# Patient Record
Sex: FEMALE | Race: WHITE | Employment: OTHER | URBAN - METROPOLITAN AREA
[De-identification: names, ages, dates, MRNs, and addresses within clinical notes are randomized per-mention and may not be internally consistent; named-entity substitution may affect disease eponyms.]

---

## 2020-01-07 LAB
CREATININE, EXTERNAL: 0.92
HBA1C MFR BLD HPLC: 8.4 %

## 2021-04-14 RX ORDER — LEVOTHYROXINE SODIUM 125 UG/1
125 TABLET ORAL
Qty: 90 TAB | Refills: 0 | Status: SHIPPED | OUTPATIENT
Start: 2021-04-14 | End: 2021-07-26 | Stop reason: SDUPTHER

## 2021-04-14 NOTE — TELEPHONE ENCOUNTER
Next Appointment: 5/7/21 with MD Keven Deluna    Requested Prescriptions     Pending Prescriptions Disp Refills    levothyroxine (Synthroid) 125 mcg tablet 90 Tab 0     Sig: Take 1 Tab by mouth Daily (before breakfast).

## 2021-05-07 ENCOUNTER — OFFICE VISIT (OUTPATIENT)
Dept: INTERNAL MEDICINE CLINIC | Age: 81
End: 2021-05-07
Payer: MEDICARE

## 2021-05-07 VITALS
SYSTOLIC BLOOD PRESSURE: 139 MMHG | WEIGHT: 158.6 LBS | HEART RATE: 76 BPM | TEMPERATURE: 97.5 F | BODY MASS INDEX: 25.49 KG/M2 | HEIGHT: 66 IN | OXYGEN SATURATION: 98 % | DIASTOLIC BLOOD PRESSURE: 78 MMHG | RESPIRATION RATE: 12 BRPM

## 2021-05-07 DIAGNOSIS — R63.4 WEIGHT LOSS: ICD-10-CM

## 2021-05-07 DIAGNOSIS — E78.5 HYPERLIPIDEMIA ASSOCIATED WITH TYPE 2 DIABETES MELLITUS (HCC): ICD-10-CM

## 2021-05-07 DIAGNOSIS — I25.811 CORONARY ARTERY DISEASE INVOLVING NATIVE ARTERY OF TRANSPLANTED HEART WITHOUT ANGINA PECTORIS: Primary | ICD-10-CM

## 2021-05-07 DIAGNOSIS — J45.20 MILD INTERMITTENT ASTHMA WITHOUT COMPLICATION: ICD-10-CM

## 2021-05-07 DIAGNOSIS — E11.9 TYPE 2 DIABETES MELLITUS TREATED WITHOUT INSULIN (HCC): ICD-10-CM

## 2021-05-07 DIAGNOSIS — I10 ESSENTIAL HYPERTENSION: ICD-10-CM

## 2021-05-07 DIAGNOSIS — E11.69 HYPERLIPIDEMIA ASSOCIATED WITH TYPE 2 DIABETES MELLITUS (HCC): ICD-10-CM

## 2021-05-07 DIAGNOSIS — R63.1 POLYDIPSIA: ICD-10-CM

## 2021-05-07 DIAGNOSIS — E03.9 ACQUIRED HYPOTHYROIDISM: ICD-10-CM

## 2021-05-07 PROCEDURE — 99215 OFFICE O/P EST HI 40 MIN: CPT | Performed by: INTERNAL MEDICINE

## 2021-05-07 PROCEDURE — G0463 HOSPITAL OUTPT CLINIC VISIT: HCPCS | Performed by: INTERNAL MEDICINE

## 2021-05-07 RX ORDER — CLOPIDOGREL BISULFATE 75 MG/1
75 TABLET ORAL
COMMUNITY
End: 2021-07-26 | Stop reason: SDUPTHER

## 2021-05-10 ENCOUNTER — HOSPITAL ENCOUNTER (OUTPATIENT)
Dept: LAB | Age: 81
Discharge: HOME | End: 2021-05-10
Payer: MEDICARE

## 2021-05-10 ENCOUNTER — APPOINTMENT (OUTPATIENT)
Dept: INTERNAL MEDICINE CLINIC | Age: 81
End: 2021-05-10

## 2021-05-10 DIAGNOSIS — R63.4 WEIGHT LOSS: ICD-10-CM

## 2021-05-10 DIAGNOSIS — E11.9 TYPE 2 DIABETES MELLITUS TREATED WITHOUT INSULIN (HCC): ICD-10-CM

## 2021-05-10 DIAGNOSIS — R63.1 POLYDIPSIA: ICD-10-CM

## 2021-05-10 DIAGNOSIS — I10 ESSENTIAL HYPERTENSION: ICD-10-CM

## 2021-05-10 DIAGNOSIS — E03.9 ACQUIRED HYPOTHYROIDISM: ICD-10-CM

## 2021-05-10 DIAGNOSIS — E11.69 HYPERLIPIDEMIA ASSOCIATED WITH TYPE 2 DIABETES MELLITUS (HCC): ICD-10-CM

## 2021-05-10 DIAGNOSIS — I25.811 CORONARY ARTERY DISEASE INVOLVING NATIVE ARTERY OF TRANSPLANTED HEART WITHOUT ANGINA PECTORIS: ICD-10-CM

## 2021-05-10 DIAGNOSIS — E78.5 HYPERLIPIDEMIA ASSOCIATED WITH TYPE 2 DIABETES MELLITUS (HCC): ICD-10-CM

## 2021-05-10 LAB
ALBUMIN SERPL-MCNC: 3.5 G/DL (ref 3.4–5)
ALBUMIN/GLOB SERPL: 1.1 {RATIO} (ref 0.8–1.7)
ALP SERPL-CCNC: 117 U/L (ref 45–117)
ALT SERPL-CCNC: 18 U/L (ref 13–56)
ANION GAP SERPL CALC-SCNC: 4 MMOL/L (ref 3–18)
APPEARANCE UR: ABNORMAL
AST SERPL-CCNC: 10 U/L (ref 10–38)
BACTERIA URNS QL MICRO: ABNORMAL /HPF
BILIRUB SERPL-MCNC: 0.5 MG/DL (ref 0.2–1)
BILIRUB UR QL: NEGATIVE
BUN SERPL-MCNC: 16 MG/DL (ref 7–18)
BUN/CREAT SERPL: 15 (ref 12–20)
CALCIUM SERPL-MCNC: 8.3 MG/DL (ref 8.5–10.1)
CHLORIDE SERPL-SCNC: 103 MMOL/L (ref 100–111)
CHOLEST SERPL-MCNC: 134 MG/DL
CO2 SERPL-SCNC: 29 MMOL/L (ref 21–32)
COLOR UR: YELLOW
CREAT SERPL-MCNC: 1.05 MG/DL (ref 0.6–1.3)
CREAT UR-MCNC: 50 MG/DL (ref 30–125)
EPITH CASTS URNS QL MICRO: ABNORMAL /LPF (ref 0–5)
EST. AVERAGE GLUCOSE BLD GHB EST-MCNC: 298 MG/DL
GLOBULIN SER CALC-MCNC: 3.1 G/DL (ref 2–4)
GLUCOSE SERPL-MCNC: 339 MG/DL (ref 74–99)
GLUCOSE UR STRIP.AUTO-MCNC: >1000 MG/DL
HBA1C MFR BLD: 12 % (ref 4.2–5.6)
HDLC SERPL-MCNC: 39 MG/DL (ref 40–60)
HDLC SERPL: 3.4 {RATIO} (ref 0–5)
HGB UR QL STRIP: NEGATIVE
KETONES UR QL STRIP.AUTO: NEGATIVE MG/DL
LDLC SERPL CALC-MCNC: 70.4 MG/DL (ref 0–100)
LEUKOCYTE ESTERASE UR QL STRIP.AUTO: ABNORMAL
LIPID PROFILE,FLP: ABNORMAL
MICROALBUMIN UR-MCNC: 2.06 MG/DL (ref 0–3)
MICROALBUMIN/CREAT UR-RTO: 41 MG/G (ref 0–30)
NITRITE UR QL STRIP.AUTO: NEGATIVE
PH UR STRIP: 5 [PH] (ref 5–8)
POTASSIUM SERPL-SCNC: 4.6 MMOL/L (ref 3.5–5.5)
PROT SERPL-MCNC: 6.6 G/DL (ref 6.4–8.2)
PROT UR STRIP-MCNC: NEGATIVE MG/DL
RBC #/AREA URNS HPF: NEGATIVE /HPF (ref 0–5)
SODIUM SERPL-SCNC: 136 MMOL/L (ref 136–145)
SP GR UR REFRACTOMETRY: >1.03 (ref 1–1.03)
TRIGL SERPL-MCNC: 123 MG/DL (ref ?–150)
TSH SERPL DL<=0.05 MIU/L-ACNC: 0.29 UIU/ML (ref 0.36–3.74)
UROBILINOGEN UR QL STRIP.AUTO: 0.2 EU/DL (ref 0.2–1)
VLDLC SERPL CALC-MCNC: 24.6 MG/DL
WBC URNS QL MICRO: ABNORMAL /HPF (ref 0–4)

## 2021-05-10 PROCEDURE — 80061 LIPID PANEL: CPT

## 2021-05-10 PROCEDURE — 36415 COLL VENOUS BLD VENIPUNCTURE: CPT

## 2021-05-10 PROCEDURE — 84443 ASSAY THYROID STIM HORMONE: CPT

## 2021-05-10 PROCEDURE — 81001 URINALYSIS AUTO W/SCOPE: CPT

## 2021-05-10 PROCEDURE — 82570 ASSAY OF URINE CREATININE: CPT

## 2021-05-10 PROCEDURE — 80053 COMPREHEN METABOLIC PANEL: CPT

## 2021-05-10 PROCEDURE — 83036 HEMOGLOBIN GLYCOSYLATED A1C: CPT

## 2021-05-20 ENCOUNTER — TELEPHONE (OUTPATIENT)
Dept: INTERNAL MEDICINE CLINIC | Age: 81
End: 2021-05-20

## 2021-05-20 DIAGNOSIS — E11.9 TYPE 2 DIABETES MELLITUS TREATED WITHOUT INSULIN (HCC): Primary | ICD-10-CM

## 2021-05-20 DIAGNOSIS — E03.9 ACQUIRED HYPOTHYROIDISM: Primary | ICD-10-CM

## 2021-05-20 DIAGNOSIS — M81.0 AGE-RELATED OSTEOPOROSIS WITHOUT CURRENT PATHOLOGICAL FRACTURE: ICD-10-CM

## 2021-05-20 RX ORDER — METFORMIN HYDROCHLORIDE 500 MG/1
TABLET, EXTENDED RELEASE ORAL
Qty: 270 TABLET | Refills: 1 | Status: SHIPPED | OUTPATIENT
Start: 2021-05-20 | End: 2021-11-11 | Stop reason: SDUPTHER

## 2021-05-20 RX ORDER — ERGOCALCIFEROL 1.25 MG/1
50000 CAPSULE ORAL
Qty: 12 CAPSULE | Refills: 1 | Status: SHIPPED | OUTPATIENT
Start: 2021-05-20 | End: 2021-11-11 | Stop reason: SDUPTHER

## 2021-05-20 NOTE — TELEPHONE ENCOUNTER
Let her know her hgb A1C was very high at 12% I sent for 3 metformin dialy instead of just 2.   That's why she has been so thristy

## 2021-05-20 NOTE — TELEPHONE ENCOUNTER
Last Visit: 5/7/21 with MD Nohelia Lucero  Next Appointment: 8/12/21 with MD russell    Requested Prescriptions     Pending Prescriptions Disp Refills    ergocalciferol (ERGOCALCIFEROL) 1,250 mcg (50,000 unit) capsule 12 Capsule 1     Sig: Take 1 Capsule by mouth every seven (7) days.  metFORMIN ER (GLUCOPHAGE XR) 500 mg tablet 180 Tablet 1     Sig: Take 2 Tablets by mouth daily.        From outside meds tabs;

## 2021-05-20 NOTE — TELEPHONE ENCOUNTER
See other message.   Also let her know her labs showed she is on too much thyroid medication-- change it to 1/2 tab on Saturdays and Sundays, 1 tab all the other days, lab order for TSH to do in 6 weeks is in there, non-fasitng OK

## 2021-05-20 NOTE — TELEPHONE ENCOUNTER
Patient wanted clarification on Metformin. Patient wants to know if she can take one tab with meals or does she take them all 3 at one time. Informed patient will have to check with Dr. Carola Guerrero and call her back.          Take all 3 at the same time

## 2021-05-20 NOTE — PROGRESS NOTES
After discussion, sterile prep and local ethyl chloride and 1% lidocaine, joint injected via medial approach with Kenalog 40 1 cc + lidocaine 1% 4 cc without complication

## 2021-05-21 NOTE — TELEPHONE ENCOUNTER
Patient is aware :  Also let her know her labs showed she is on too much thyroid medication-- change it to 1/2 tab on Saturdays and Sundays, 1 tab all the other days, lab order for TSH to do in 6 weeks. Patient verbalizes understanding and lab appointment scheduled for TSH only.

## 2021-06-25 ENCOUNTER — TELEPHONE (OUTPATIENT)
Dept: INTERNAL MEDICINE CLINIC | Age: 81
End: 2021-06-25

## 2021-06-25 ENCOUNTER — HOSPITAL ENCOUNTER (OUTPATIENT)
Dept: LAB | Age: 81
Discharge: HOME | End: 2021-06-25
Payer: MEDICARE

## 2021-06-25 ENCOUNTER — APPOINTMENT (OUTPATIENT)
Dept: INTERNAL MEDICINE CLINIC | Age: 81
End: 2021-06-25

## 2021-06-25 DIAGNOSIS — E03.9 ACQUIRED HYPOTHYROIDISM: ICD-10-CM

## 2021-06-25 LAB — TSH SERPL DL<=0.05 MIU/L-ACNC: 0.67 UIU/ML (ref 0.36–3.74)

## 2021-06-25 PROCEDURE — 36415 COLL VENOUS BLD VENIPUNCTURE: CPT

## 2021-06-25 PROCEDURE — 84443 ASSAY THYROID STIM HORMONE: CPT

## 2021-06-25 NOTE — TELEPHONE ENCOUNTER
Patient is aware     Let her know thyroid test in good range, continue med as she is taking it now    Patient verbalizes understanding.

## 2021-07-26 RX ORDER — CLOPIDOGREL BISULFATE 75 MG/1
75 TABLET ORAL DAILY
Qty: 90 TABLET | Refills: 1 | Status: SHIPPED | OUTPATIENT
Start: 2021-07-26 | End: 2022-01-28 | Stop reason: SDUPTHER

## 2021-07-26 RX ORDER — SIMVASTATIN 20 MG/1
20 TABLET, FILM COATED ORAL
Qty: 90 TABLET | Refills: 1 | Status: SHIPPED | OUTPATIENT
Start: 2021-07-26 | End: 2022-01-07 | Stop reason: SDUPTHER

## 2021-07-26 RX ORDER — LEVOTHYROXINE SODIUM 125 UG/1
125 TABLET ORAL
Qty: 90 TABLET | Refills: 1 | Status: SHIPPED | OUTPATIENT
Start: 2021-07-26 | End: 2022-01-28 | Stop reason: SDUPTHER

## 2021-07-26 RX ORDER — LISINOPRIL 2.5 MG/1
2.5 TABLET ORAL DAILY
Qty: 90 TABLET | Refills: 1 | Status: SHIPPED | OUTPATIENT
Start: 2021-07-26 | End: 2022-01-24 | Stop reason: SDUPTHER

## 2021-07-26 NOTE — TELEPHONE ENCOUNTER
Last Visit: 5/7/21 with MD Chelita Deal  Next Appointment: 8/12/21 with MD Chelita Deal  Previous Refill Encounter(s): 4/14/21 Synthroid #90    Requested Prescriptions     Pending Prescriptions Disp Refills    levothyroxine (Synthroid) 125 mcg tablet 90 Tablet 1     Sig: Take 1 Tablet by mouth Daily (before breakfast).  simvastatin (ZOCOR) 20 mg tablet 90 Tablet 1     Sig: Take 1 Tablet by mouth nightly.  lisinopriL (PRINIVIL, ZESTRIL) 2.5 mg tablet 90 Tablet 1     Sig: Take 1 Tablet by mouth daily.  clopidogreL (Plavix) 75 mg tab 90 Tablet 1     Sig: Take 1 Tablet by mouth daily.

## 2021-08-12 ENCOUNTER — OFFICE VISIT (OUTPATIENT)
Dept: INTERNAL MEDICINE CLINIC | Age: 81
End: 2021-08-12
Payer: MEDICARE

## 2021-08-12 VITALS
HEIGHT: 66 IN | TEMPERATURE: 98.1 F | OXYGEN SATURATION: 95 % | HEART RATE: 74 BPM | RESPIRATION RATE: 12 BRPM | BODY MASS INDEX: 26.23 KG/M2 | WEIGHT: 163.2 LBS | DIASTOLIC BLOOD PRESSURE: 76 MMHG | SYSTOLIC BLOOD PRESSURE: 127 MMHG

## 2021-08-12 DIAGNOSIS — I10 ESSENTIAL HYPERTENSION: ICD-10-CM

## 2021-08-12 DIAGNOSIS — E55.9 VITAMIN D DEFICIENCY: ICD-10-CM

## 2021-08-12 DIAGNOSIS — R21 RASH: ICD-10-CM

## 2021-08-12 DIAGNOSIS — Z87.898 HISTORY OF WEIGHT LOSS: ICD-10-CM

## 2021-08-12 DIAGNOSIS — E11.65 TYPE 2 DIABETES MELLITUS WITH HYPERGLYCEMIA, WITHOUT LONG-TERM CURRENT USE OF INSULIN (HCC): Primary | ICD-10-CM

## 2021-08-12 LAB — HBA1C MFR BLD HPLC: 9.8 %

## 2021-08-12 PROCEDURE — 99214 OFFICE O/P EST MOD 30 MIN: CPT | Performed by: INTERNAL MEDICINE

## 2021-08-12 PROCEDURE — 83036 HEMOGLOBIN GLYCOSYLATED A1C: CPT | Performed by: INTERNAL MEDICINE

## 2021-08-12 PROCEDURE — G0463 HOSPITAL OUTPT CLINIC VISIT: HCPCS | Performed by: INTERNAL MEDICINE

## 2021-08-12 NOTE — PROGRESS NOTES
BLANKA Ruvalcaba returns for follow-up of her diabetes. She admits she is still only taking 2 Metformin daily, since it is hard for her to remember all 3 doses. I reviewed with her that with the extended release, she may take all 3 tablets at once. She has noticed a pruritic rash on each shoulder, and just recently started using hydrocortisone cream with improvement. Her blood pressure at home on her digital cuff has been as high as 150 mmHg. She has been stressed due to her granddaughter having [facility] spotted fever. Past Medical History:   Diagnosis Date    Acquired hypothyroidism 5/7/2021    Asthma     seasonal    Cancer ([facility] [address]     R breast cancer mestatized to liver 2000    Coronary artery disease involving native artery of transplanted heart without angina pectoris     History of TIAs     short term memory    Hypertension     TIA (transient ischemic attack) last one 2013    h/o TIA's    Type 2 diabetes mellitus treated without insulin ([facility] [address] 5/7/2021        Past Surgical History:   Procedure Laterality Date    CARDIAC CATHETERIZATION  3/10/2016         CORONARY ARTERY ANGIOGRAM  3/10/2016         HX CORONARY ARTERY BYPASS GRAFT      [facility]/15    [facility]     HX ORTHOPAEDIC      R hip & femur     HX OTHER SURGICAL      endometrial biopsy-- normal     HX TONSILLECTOMY      LV ANGIOGRAPHY  3/10/2016         [place] BREAST SURGERY PROCEDURE UNLISTED      R breast mastectomy . Dr. Conner Course        Current Outpatient Medications   Medication Sig Dispense Refill    levothyroxine (Synthroid) 125 mcg tablet Take 1 Tablet by mouth Daily (before breakfast). 90 Tablet 1    simvastatin (ZOCOR) 20 mg tablet Take 1 Tablet by mouth nightly. 90 Tablet 1    lisinopriL (PRINIVIL, ZESTRIL) 2.5 mg tablet Take 1 Tablet by mouth daily. 90 Tablet 1    clopidogreL (Plavix) 75 mg tab Take 1 Tablet by mouth daily.  90 Tablet 1    ergocalciferol (ERGOCALCIFEROL) 1,250 mcg (50,000 unit) capsule Take 1 Capsule by mouth every seven (7) days. 12 Capsule 1    metFORMIN ER (GLUCOPHAGE XR) 500 mg tablet 3 po daily, new dose 270 Tablet 1    aspirin delayed-release 162 mg tablet Take 1 Tab by mouth daily. 30 Tab 1    metoprolol succinate (TOPROL XL) 50 mg XL tablet Take 50 mg by mouth daily.  albuterol (PROVENTIL HFA, VENTOLIN HFA, PROAIR HFA) 90 mcg/actuation inhaler Take  by inhalation.  fluticasone (FLOVENT HFA) 110 mcg/actuation inhaler Take 2 Puffs by inhalation every twelve (12) hours as needed. Allergies   Allergen Reactions    Dilaudid [Hydromorphone] Nausea and Vomiting    Pollen Extracts Sneezing        Social History     Socioeconomic History    Marital status:      Spouse name: Not on file    Number of children: Not on file    Years of education: Not on file    Highest education level: Not on file   Occupational History    Not on file   Tobacco Use    Smoking status: Never Smoker    Smokeless tobacco: Never Used   Vaping Use    Vaping Use: Never used   Substance and Sexual Activity    Alcohol use: No    Drug use: No    Sexual activity: Not on file   Other Topics Concern    Not on file   Social History Narrative    Not on file     Social Determinants of Health     Financial Resource Strain:     Difficulty of Paying Living Expenses:    Food Insecurity:     Worried About Running Out of Food in the Last Year:     [address] in the Last Year:    Transportation Needs:     Lack of Transportation (Medical):      Lack of Transportation (Non-Medical):    Physical Activity:     Days of Exercise per Week:     Minutes of Exercise per Session:    Stress:     Feeling of Stress :    Social Connections:     Frequency of Communication with Friends and Family:     Frequency of Social Gatherings with Friends and Family:     Attends Bahai Services:     Active Member of Clubs or Organizations:     Attends Club or Organization Meetings:     Marital Status: Intimate Partner Violence:     Fear of Current or Ex-Partner:     Emotionally Abused:     Physically Abused:     Sexually Abused:         Family History   Problem Relation Age of Onset    Stroke Mother     Heart Attack Father         at 64    Breast Cancer Sister     High Cholesterol Son     Thyroid Disease Son         hypothyroidism           Visit Vitals  /76 (BP 1 Location: Left upper arm, BP Patient Position: Sitting)   Pulse 74   Temp 98.1 °F (36.7 °C) (Temporal)   Resp 12   Ht 5' 6\" (1.676 m)   Wt 163 lb 3.2 oz (74 kg)   SpO2 95%   BMI 26.34 kg/m²        Review of Systems   Constitutional: Negative for fever. Eyes: Negative for blurred vision. Respiratory: Negative for shortness of breath. Cardiovascular: Negative for chest pain. Gastrointestinal: Negative for blood in stool. Neurological: Negative for tingling and headaches. Psychiatric/Behavioral: Negative for depression. The patient is not nervous/anxious. Physical Exam  Constitutional:       General: She is not in acute distress. Appearance: She is not ill-appearing. Comments: Well-developed, well-nourished, weight up 5 pounds since last visit. Eyes:      General: No scleral icterus. Conjunctiva/sclera: Conjunctivae normal.   Cardiovascular:      Rate and Rhythm: Normal rate and regular rhythm. Pulses: Normal pulses. Heart sounds: No friction rub. No gallop. Pulmonary:      Effort: Pulmonary effort is normal.      Breath sounds: Normal breath sounds. Abdominal:      General: Abdomen is flat. Palpations: Abdomen is soft. There is no mass. Tenderness: There is no abdominal tenderness. Musculoskeletal:      Cervical back: Neck supple. Comments: No clubbing, cyanosis, or edema. Skin:     General: Skin is warm and dry. Comments: A few excoriations on top of right shoulder. Scattered pink mildly elevated lesions in the left upper arm and shoulder area.   No vesicles appreciated. Neurological:      Mental Status: She is alert and oriented to person, place, and time. Psychiatric:         Mood and Affect: Mood normal.                  Diagnoses and all orders for this visit:    1. Type 2 diabetes mellitus with hyperglycemia, without long-term current use of insulin ([facility])  Comments:  A1c improved from 12 to 9.8%, not at goal under 7%. Advised to take Metformin 3 daily at the same time. CMP 2 weeks, fingerstick A1c at next visit. Orders:  -     METABOLIC PANEL, COMPREHENSIVE; Future    2. History of weight loss  Comments:  Discussed was probably volume depletion from uncontrolled diabetes, has reversed. Orders:  -     AMB POC HEMOGLOBIN A1C    3. Vitamin D deficiency  Comments:  Check level with next labs, on weekly supplement. Orders:  -     VITAMIN D, 25 HYDROXY; Future    4. Rash  Comments:  Possible arthropod bites versus contact dermatitis, thin film of over-the-counter hydrocortisone to affected areas twice daily as needed    5. Essential hypertension  Comments:  controlled, continue medications. Follow-up and Dispositions    · Return for 30 minute Medicare wellness/ [facility]-- do labs in 2 weeks.               Danielito Jimenes MD

## 2021-09-01 ENCOUNTER — HOSPITAL ENCOUNTER (OUTPATIENT)
Dept: LAB | Age: 81
Discharge: HOME | End: 2021-09-01
Payer: MEDICARE

## 2021-09-01 ENCOUNTER — APPOINTMENT (OUTPATIENT)
Dept: INTERNAL MEDICINE CLINIC | Age: 81
End: 2021-09-01

## 2021-09-01 DIAGNOSIS — E55.9 VITAMIN D DEFICIENCY: ICD-10-CM

## 2021-09-01 DIAGNOSIS — E11.65 TYPE 2 DIABETES MELLITUS WITH HYPERGLYCEMIA, WITHOUT LONG-TERM CURRENT USE OF INSULIN (HCC): ICD-10-CM

## 2021-09-01 LAB
25(OH)D3 SERPL-MCNC: 49.2 NG/ML (ref 30–100)
ALBUMIN SERPL-MCNC: 3.3 G/DL (ref 3.4–5)
ALBUMIN/GLOB SERPL: 1 {RATIO} (ref 0.8–1.7)
ALP SERPL-CCNC: 97 U/L (ref 45–117)
ALT SERPL-CCNC: 15 U/L (ref 13–56)
ANION GAP SERPL CALC-SCNC: 9 MMOL/L (ref 3–18)
AST SERPL-CCNC: 9 U/L (ref 10–38)
BILIRUB SERPL-MCNC: 0.5 MG/DL (ref 0.2–1)
BUN SERPL-MCNC: 19 MG/DL (ref 7–18)
BUN/CREAT SERPL: 17 (ref 12–20)
CALCIUM SERPL-MCNC: 8.7 MG/DL (ref 8.5–10.1)
CHLORIDE SERPL-SCNC: 102 MMOL/L (ref 100–111)
CO2 SERPL-SCNC: 28 MMOL/L (ref 21–32)
CREAT SERPL-MCNC: 1.1 MG/DL (ref 0.6–1.3)
GLOBULIN SER CALC-MCNC: 3.4 G/DL (ref 2–4)
GLUCOSE SERPL-MCNC: 265 MG/DL (ref 74–99)
POTASSIUM SERPL-SCNC: 4.2 MMOL/L (ref 3.5–5.5)
PROT SERPL-MCNC: 6.7 G/DL (ref 6.4–8.2)
SODIUM SERPL-SCNC: 139 MMOL/L (ref 136–145)

## 2021-09-01 PROCEDURE — 80053 COMPREHEN METABOLIC PANEL: CPT

## 2021-09-01 PROCEDURE — 36415 COLL VENOUS BLD VENIPUNCTURE: CPT

## 2021-09-01 PROCEDURE — 82306 VITAMIN D 25 HYDROXY: CPT

## 2021-09-08 ENCOUNTER — TELEPHONE (OUTPATIENT)
Dept: INTERNAL MEDICINE CLINIC | Age: 81
End: 2021-09-08

## 2021-09-08 DIAGNOSIS — R94.4 DECREASED GFR: Primary | ICD-10-CM

## 2021-09-08 NOTE — TELEPHONE ENCOUNTER
Labs show her kidney function is a little bit decreased. Make sure she is drinking plenty of fluids, and that she is not taking anything over-the-counter for pain except Tylenol. I am putting in an order for her to have nonfasting labs drawn in about 2 weeks to recheck her kidney function. Nonfasting is fine, please schedule her for this (BMP).

## 2021-11-11 DIAGNOSIS — E11.9 TYPE 2 DIABETES MELLITUS TREATED WITHOUT INSULIN (HCC): ICD-10-CM

## 2021-11-11 DIAGNOSIS — M81.0 AGE-RELATED OSTEOPOROSIS WITHOUT CURRENT PATHOLOGICAL FRACTURE: ICD-10-CM

## 2021-11-11 RX ORDER — METFORMIN HYDROCHLORIDE 500 MG/1
1500 TABLET, EXTENDED RELEASE ORAL DAILY
Qty: 270 TABLET | Refills: 1 | Status: SHIPPED | OUTPATIENT
Start: 2021-11-11 | End: 2021-11-12 | Stop reason: DRUGHIGH

## 2021-11-11 RX ORDER — METOPROLOL SUCCINATE 50 MG/1
50 TABLET, EXTENDED RELEASE ORAL DAILY
Qty: 90 TABLET | Refills: 1 | Status: SHIPPED | OUTPATIENT
Start: 2021-11-11 | End: 2022-05-24 | Stop reason: SDUPTHER

## 2021-11-11 RX ORDER — ERGOCALCIFEROL 1.25 MG/1
50000 CAPSULE ORAL
Qty: 12 CAPSULE | Refills: 1 | Status: SHIPPED | OUTPATIENT
Start: 2021-11-11 | End: 2022-05-26 | Stop reason: SDUPTHER

## 2021-11-11 NOTE — TELEPHONE ENCOUNTER
Last Visit: 8/12/21 with MD Rah Marsh  Next Appointment: 11/12/21 with MD Rah Marsh  Previous Refill Encounter(s): 5/20/21 90 d/s with 1 refill    Requested Prescriptions     Pending Prescriptions Disp Refills    ergocalciferol (ERGOCALCIFEROL) 1,250 mcg (50,000 unit) capsule 12 Capsule 1     Sig: Take 1 Capsule by mouth every seven (7) days.  metoprolol succinate (Toprol XL) 50 mg XL tablet 90 Tablet 1     Sig: Take 1 Tablet by mouth daily.  metFORMIN ER (GLUCOPHAGE XR) 500 mg tablet 270 Tablet 1     Sig: Take 3 Tablets by mouth daily.

## 2021-11-12 ENCOUNTER — OFFICE VISIT (OUTPATIENT)
Dept: INTERNAL MEDICINE CLINIC | Age: 81
End: 2021-11-12
Payer: MEDICARE

## 2021-11-12 VITALS
WEIGHT: 166.6 LBS | BODY MASS INDEX: 26.78 KG/M2 | DIASTOLIC BLOOD PRESSURE: 71 MMHG | SYSTOLIC BLOOD PRESSURE: 125 MMHG | TEMPERATURE: 96.8 F | RESPIRATION RATE: 16 BRPM | HEART RATE: 79 BPM | HEIGHT: 66 IN | OXYGEN SATURATION: 96 %

## 2021-11-12 DIAGNOSIS — Z23 NEEDS FLU SHOT: ICD-10-CM

## 2021-11-12 DIAGNOSIS — Z00.00 MEDICARE ANNUAL WELLNESS VISIT, SUBSEQUENT: Primary | ICD-10-CM

## 2021-11-12 DIAGNOSIS — J45.20 MILD INTERMITTENT ASTHMA WITHOUT COMPLICATION: ICD-10-CM

## 2021-11-12 DIAGNOSIS — Z71.89 ADVANCED DIRECTIVES, COUNSELING/DISCUSSION: ICD-10-CM

## 2021-11-12 DIAGNOSIS — R94.4 DECREASED GFR: ICD-10-CM

## 2021-11-12 DIAGNOSIS — E11.65 TYPE 2 DIABETES MELLITUS WITH HYPERGLYCEMIA, WITHOUT LONG-TERM CURRENT USE OF INSULIN (HCC): ICD-10-CM

## 2021-11-12 LAB — HBA1C MFR BLD HPLC: 8.4 %

## 2021-11-12 PROCEDURE — 99497 ADVNCD CARE PLAN 30 MIN: CPT | Performed by: INTERNAL MEDICINE

## 2021-11-12 PROCEDURE — G0439 PPPS, SUBSEQ VISIT: HCPCS | Performed by: INTERNAL MEDICINE

## 2021-11-12 PROCEDURE — G8536 NO DOC ELDER MAL SCRN: HCPCS | Performed by: INTERNAL MEDICINE

## 2021-11-12 PROCEDURE — G8419 CALC BMI OUT NRM PARAM NOF/U: HCPCS | Performed by: INTERNAL MEDICINE

## 2021-11-12 PROCEDURE — G8427 DOCREV CUR MEDS BY ELIG CLIN: HCPCS | Performed by: INTERNAL MEDICINE

## 2021-11-12 PROCEDURE — 1101F PT FALLS ASSESS-DOCD LE1/YR: CPT | Performed by: INTERNAL MEDICINE

## 2021-11-12 PROCEDURE — 90694 VACC AIIV4 NO PRSRV 0.5ML IM: CPT | Performed by: INTERNAL MEDICINE

## 2021-11-12 PROCEDURE — 3052F HG A1C>EQUAL 8.0%<EQUAL 9.0%: CPT | Performed by: INTERNAL MEDICINE

## 2021-11-12 PROCEDURE — 83036 HEMOGLOBIN GLYCOSYLATED A1C: CPT | Performed by: INTERNAL MEDICINE

## 2021-11-12 PROCEDURE — 99213 OFFICE O/P EST LOW 20 MIN: CPT | Performed by: INTERNAL MEDICINE

## 2021-11-12 PROCEDURE — G8754 DIAS BP LESS 90: HCPCS | Performed by: INTERNAL MEDICINE

## 2021-11-12 PROCEDURE — G8432 DEP SCR NOT DOC, RNG: HCPCS | Performed by: INTERNAL MEDICINE

## 2021-11-12 PROCEDURE — G8400 PT W/DXA NO RESULTS DOC: HCPCS | Performed by: INTERNAL MEDICINE

## 2021-11-12 PROCEDURE — G8752 SYS BP LESS 140: HCPCS | Performed by: INTERNAL MEDICINE

## 2021-11-12 PROCEDURE — 1090F PRES/ABSN URINE INCON ASSESS: CPT | Performed by: INTERNAL MEDICINE

## 2021-11-12 PROCEDURE — G0463 HOSPITAL OUTPT CLINIC VISIT: HCPCS | Performed by: INTERNAL MEDICINE

## 2021-11-12 RX ORDER — METFORMIN HYDROCHLORIDE 500 MG/1
TABLET, EXTENDED RELEASE ORAL
Qty: 360 TABLET | Refills: 1 | Status: SHIPPED | OUTPATIENT
Start: 2021-11-12 | End: 2022-01-31 | Stop reason: SDUPTHER

## 2021-11-12 RX ORDER — METFORMIN HYDROCHLORIDE 500 MG/1
500 TABLET, EXTENDED RELEASE ORAL DAILY
Qty: 360 TABLET | Refills: 1 | Status: SHIPPED | OUTPATIENT
Start: 2021-11-12 | End: 2021-11-12 | Stop reason: SDUPTHER

## 2021-11-12 NOTE — PROGRESS NOTES
Italo Glynn [de-identified] female [de-identified] y.o.       1. Have you been to the ER, urgent care clinic since your last visit? Hospitalized since your last visit? No    2. Have you seen or consulted any other health care providers outside of the [address] [place] [address] since your last visit? Include any pap smears or colon screening. No     Curtis Serrano is a [de-identified] y.o. female who presents forimmunization. she denies any symptoms , reactions or allergies that would exclude them from being immunized today. Risks and adverse reactions were discussed and the VIS was given to them. All questions were addressed. she was observed for 15 min post injection. There were no reactions observed.     Yosvany Smith

## 2021-11-12 NOTE — PROGRESS NOTES
BLANKA Hidalgo is here to follow-up on her diabetes, and also for her annual Medicare wellness/advanced care planning visit. She is tolerating 3 Metformin without difficulty. Past Medical History:   Diagnosis Date    Acquired hypothyroidism 5/7/2021    Asthma     seasonal    Cancer ([facility] [address]     R breast cancer mestatized to liver 2000    Coronary artery disease involving native artery of transplanted heart without angina pectoris     History of TIAs     short term memory    Hypertension     TIA (transient ischemic attack) last one 2013    h/o TIA's    Type 2 diabetes mellitus treated without insulin ([facility] [address] 5/7/2021        Past Surgical History:   Procedure Laterality Date    CARDIAC CATHETERIZATION  3/10/2016         CORONARY ARTERY ANGIOGRAM  3/10/2016         HX CORONARY ARTERY BYPASS GRAFT      HX ERCP  4/29/15    [facility]     HX ORTHOPAEDIC      R hip & femur     HX OTHER SURGICAL      endometrial biopsy-- normal     HX TONSILLECTOMY      LV ANGIOGRAPHY  3/10/2016         CT BREAST SURGERY PROCEDURE UNLISTED      R breast mastectomy . Dr. Miriam Morejon        Current Outpatient Medications   Medication Sig Dispense Refill    metFORMIN ER (GLUCOPHAGE XR) 500 mg tablet Take 1 Tablet by mouth daily. New dose 360 Tablet 1    ergocalciferol (ERGOCALCIFEROL) 1,250 mcg (50,000 unit) capsule Take 1 Capsule by mouth every seven (7) days. 12 Capsule 1    metoprolol succinate (Toprol XL) 50 mg XL tablet Take 1 Tablet by mouth daily. 90 Tablet 1    levothyroxine (Synthroid) 125 mcg tablet Take 1 Tablet by mouth Daily (before breakfast). 90 Tablet 1    simvastatin (ZOCOR) 20 mg tablet Take 1 Tablet by mouth nightly. 90 Tablet 1    lisinopriL (PRINIVIL, ZESTRIL) 2.5 mg tablet Take 1 Tablet by mouth daily. 90 Tablet 1    clopidogreL (Plavix) 75 mg tab Take 1 Tablet by mouth daily. 90 Tablet 1    aspirin delayed-release 162 mg tablet Take 1 Tab by mouth daily.  30 Tab 1    albuterol (PROVENTIL HFA, VENTOLIN HFA, PROAIR HFA) 90 mcg/actuation inhaler Take  by inhalation.  fluticasone (FLOVENT HFA) 110 mcg/actuation inhaler Take 2 Puffs by inhalation every twelve (12) hours as needed. Allergies   Allergen Reactions    Dilaudid [Hydromorphone] Nausea and Vomiting    Pollen Extracts Sneezing        Social History     Socioeconomic History    Marital status:      Spouse name: Not on file    Number of children: Not on file    Years of education: Not on file    Highest education level: Not on file   Occupational History    Not on file   Tobacco Use    Smoking status: Never Smoker    Smokeless tobacco: Never Used   Vaping Use    Vaping Use: Never used   Substance and Sexual Activity    Alcohol use: No    Drug use: No    Sexual activity: Not on file   Other Topics Concern    Not on file   Social History Narrative    Not on file     Social Determinants of Health     Financial Resource Strain:     Difficulty of Paying Living Expenses: Not on file   Food Insecurity:     Worried About Running Out of Food in the Last Year: Not on file    Marquez of Food in the Last Year: Not on file   Transportation Needs:     Lack of Transportation (Medical): Not on file    Lack of Transportation (Non-Medical):  Not on file   Physical Activity:     Days of Exercise per Week: Not on file    Minutes of Exercise per Session: Not on file   Stress:     Feeling of Stress : Not on file   Social Connections:     Frequency of Communication with Friends and Family: Not on file    Frequency of Social Gatherings with Friends and Family: Not on file    Attends Restoration Services: Not on file    Active Member of Clubs or Organizations: Not on file    Attends Club or Organization Meetings: Not on file    Marital Status: Not on file   Intimate Partner Violence:     Fear of Current or Ex-Partner: Not on file    Emotionally Abused: Not on file    Physically Abused: Not on file    Sexually Abused: Not on file   Housing Stability:     Unable to Pay for Housing in the Last Year: Not on file    Number of [place] in the Last Year: Not on file    Unstable Housing in the Last Year: Not on file        Family History   Problem Relation Age of Onset    Stroke Mother     Heart Attack Father         at 64    Breast Cancer Sister     High Cholesterol Son     Thyroid Disease Son         hypothyroidism           Visit Vitals  /71 (BP 1 Location: Left arm, BP Patient Position: Sitting, BP Cuff Size: Adult)   Pulse 79   Temp 96.8 °F (36 °C) (Temporal)   Resp 16   Ht 5' 6\" (1.676 m)   Wt 166 lb 9.6 oz (75.6 kg)   SpO2 96%   BMI 26.89 kg/m²        Review of Systems   Eyes: Negative for blurred vision. Respiratory: Negative for shortness of breath. Cardiovascular: Negative for chest pain. Gastrointestinal: Negative for blood in stool. Genitourinary: Negative for hematuria. Neurological: Negative for tingling and headaches. Psychiatric/Behavioral: Negative for depression. The patient is not nervous/anxious. Physical Exam  Constitutional:       General: She is not in acute distress. HENT:      Right Ear: Tympanic membrane, ear canal and external ear normal.      Left Ear: Tympanic membrane, ear canal and external ear normal.   Eyes:      General: No scleral icterus. Conjunctiva/sclera: Conjunctivae normal.   Neck:      Comments: Bilateral carotid upstrokes normal to palpation  Lymph: No cervical, no supraclavicular lymphadenopathy. Cardiovascular:      Rate and Rhythm: Normal rate and regular rhythm. Pulses: Normal pulses. Heart sounds: No friction rub. No gallop. Pulmonary:      Effort: Pulmonary effort is normal.      Breath sounds: Normal breath sounds. Abdominal:      General: Abdomen is flat. Palpations: Abdomen is soft. Tenderness: There is no abdominal tenderness. Musculoskeletal:      Cervical back: Neck supple.       Comments: No clubbing, cyanosis, or edema.   Skin:     General: Skin is warm and dry. Neurological:      Mental Status: She is alert and oriented to person, place, and time. Psychiatric:         Mood and Affect: Mood normal.                  Diagnoses and all orders for this visit:    1. Medicare annual wellness visit, subsequent  -     ADVANCE CARE PLANNING FIRST 30 MINS    2. Advanced directives, counseling/discussion  -     ADVANCE CARE PLANNING FIRST 30 MINS    3. Needs flu shot  -     FLU (FLUAD QUAD INFLUENZA VACCINE,QUAD,ADJUVANTED)    4. Mild intermittent asthma without complication  -     FLU (FLUAD QUAD INFLUENZA VACCINE,QUAD,ADJUVANTED)    5. Type 2 diabetes mellitus with hyperglycemia, without long-term current use of insulin (HCC)  -     AMB POC HEMOGLOBIN A1C  -     metFORMIN ER (GLUCOPHAGE XR) 500 mg tablet; Take 1 Tablet by mouth daily. New dose    6. Decreased GFR  -     METABOLIC PANEL, COMPREHENSIVE; Future         Follow-up and Dispositions    · Return in about 3 months (around 2/12/2022) for 15 minutes-- follow up diabetes-- labs in 3 weeks. Wendy Moore MD   This is the Subsequent Medicare Annual Wellness Exam, performed 12 months or more after the Initial AWV or the last Subsequent AWV    I have reviewed the patient's medical history in detail and updated the computerized patient record. Assessment/Plan   Education and counseling provided:  Are appropriate based on today's review and evaluation    1. Advanced directives, counseling/discussion  2.  Medicare annual wellness visit, subsequent       Depression Risk Factor Screening     3 most recent PHQ Screens 5/7/2021   Little interest or pleasure in doing things Not at all   Feeling down, depressed, irritable, or hopeless Not at all   Total Score PHQ 2 0       Alcohol Risk Screen    Do you average more than 1 drink per night or more than 7 drinks a week:  No    On any one occasion in the past three months have you have had more than 3 drinks containing alcohol:  No        Functional Ability and Level of Safety    Hearing: The patient needs further evaluation. Activities of Daily Living: The home contains: handrails  Patient does total self care      Ambulation: with no difficulty     Fall Risk:  Fall Risk Assessment, last 12 mths 5/7/2021   Able to walk? Yes   Fall in past 12 months? 0   Do you feel unsteady? 1   Are you worried about falling 1   Is TUG test greater than 12 seconds?  1   Is the gait abnormal? 1   Number of falls in past 12 months 0      Abuse Screen:  Patient is not abused       Cognitive Screening    Has your family/caregiver stated any concerns about your memory: no     Cognitive Screening: Normal - Mini Cog Test    Health Maintenance Due     Health Maintenance Due   Topic Date Due    Foot Exam Q1  Never done    Eye Exam Retinal or Dilated  Never done    DTaP/Tdap/Td series (1 - Tdap) Never done    Shingrix Vaccine Age 50> (1 of 2) Never done    Bone Densitometry (Dexa) Screening  Never done    Pneumococcal 65+ years (1 of 1 - PPSV23) Never done    Medicare Yearly Exam  Never done    Flu Vaccine (1) Never done    COVID-19 Vaccine (3 - Booster for Pfizer series) 09/03/2021       Patient Care Team   Patient Care Team:  Alfreda Kaplan MD as PCP - General (Internal Medicine)  Alfreda Kaplan MD as PCP [facility] Empaneled Provider  Uvaldo Delgadillo MD as Referring Provider (Surgery)    History     Patient Active Problem List   Diagnosis Code    Choledocholithiasis K80.50    Unstable angina ([facility] [address] I20.0    Coronary artery disease involving native artery of transplanted heart without angina pectoris I25.811    Asthma J45.909    Cancer ([address] C80.1    Hypertension I10    Acquired hypothyroidism E03.9    Type 2 diabetes mellitus treated without insulin ([address] E11.9     Past Medical History:   Diagnosis Date    Acquired hypothyroidism 5/7/2021    Asthma     seasonal    Cancer ([address]     R breast cancer mestatized to liver 2000    Coronary artery disease involving native artery of transplanted heart without angina pectoris     History of TIAs     short term memory    Hypertension     TIA (transient ischemic attack) last one 2013    h/o TIA's    Type 2 diabetes mellitus treated without insulin ([facility] [address] 5/7/2021      Past Surgical History:   Procedure Laterality Date    CARDIAC CATHETERIZATION  3/10/2016         CORONARY ARTERY ANGIOGRAM  3/10/2016         HX CORONARY ARTERY BYPASS GRAFT      [facility]  [facility]    [facility]     HX ORTHOPAEDIC      R hip & femur     HX OTHER SURGICAL      endometrial biopsy-- normal     HX TONSILLECTOMY      LV ANGIOGRAPHY  3/10/2016         [place] BREAST SURGERY PROCEDURE UNLISTED      R breast mastectomy . Dr. Lori Oconnor     Current Outpatient Medications   Medication Sig Dispense Refill    ergocalciferol (ERGOCALCIFEROL) 1,250 mcg (50,000 unit) capsule Take 1 Capsule by mouth every seven (7) days. 12 Capsule 1    metoprolol succinate (Toprol XL) 50 mg XL tablet Take 1 Tablet by mouth daily. 90 Tablet 1    metFORMIN ER (GLUCOPHAGE XR) 500 mg tablet Take 3 Tablets by mouth daily. 270 Tablet 1    levothyroxine (Synthroid) 125 mcg tablet Take 1 Tablet by mouth Daily (before breakfast). 90 Tablet 1    simvastatin (ZOCOR) 20 mg tablet Take 1 Tablet by mouth nightly. 90 Tablet 1    lisinopriL (PRINIVIL, ZESTRIL) 2.5 mg tablet Take 1 Tablet by mouth daily. 90 Tablet 1    clopidogreL (Plavix) 75 mg tab Take 1 Tablet by mouth daily. 90 Tablet 1    aspirin delayed-release 162 mg tablet Take 1 Tab by mouth daily. 30 Tab 1    albuterol (PROVENTIL HFA, VENTOLIN HFA, PROAIR HFA) 90 mcg/actuation inhaler Take  by inhalation.  fluticasone (FLOVENT HFA) 110 mcg/actuation inhaler Take 2 Puffs by inhalation every twelve (12) hours as needed.        Allergies   Allergen Reactions    Dilaudid [Hydromorphone] Nausea and Vomiting    Pollen Extracts Sneezing       Family History   Problem Relation Age of Onset    Stroke Mother     Heart Attack Father         at 64    Breast Cancer Sister     High Cholesterol Son     Thyroid Disease Son         hypothyroidism     Social History     Tobacco Use    Smoking status: Never Smoker    Smokeless tobacco: Never Used   Substance Use Topics    Alcohol use: No         Areli Parikh MD

## 2021-11-12 NOTE — PATIENT INSTRUCTIONS
Medicare Wellness Visit, Female     The best way to live healthy is to have a lifestyle where you eat a well-balanced diet, exercise regularly, limit alcohol use, and quit all forms of tobacco/nicotine, if applicable. Regular preventive services are another way to keep healthy. Preventive services (vaccines, screening tests, monitoring & exams) can help personalize your care plan, which helps you manage your own care. Screening tests can find health problems at the earliest stages, when they are easiest to treat. [facility] follows the current, evidence-based guidelines published by the [facility] ([facility]) when recommending preventive services for our patients. Because we follow these guidelines, sometimes recommendations change over time as research supports it. (For example, mammograms used to be recommended annually. Even though Medicare will still pay for an annual mammogram, the newer guidelines recommend a mammogram every two years for women of average risk). Of course, you and your doctor may decide to screen more often for some diseases, based on your risk and your co-morbidities (chronic disease you are already diagnosed with). Preventive services for you include:  - Medicare offers their members a free annual wellness visit, which is time for you and your primary care provider to discuss and plan for your preventive service needs. Take advantage of this benefit every year!  -All adults over the age of 72 should receive the recommended pneumonia vaccines. Current USPSTF guidelines recommend a series of two vaccines for the best pneumonia protection.   -All adults should have a flu vaccine yearly and a tetanus vaccine every 10 years.   -All adults age 48 and older should receive the shingles vaccines (series of two vaccines).       -All adults age 38-68 who are overweight should have a diabetes screening test once every three years.   -All adults born between [birth year] and 1964 should be screened once for Hepatitis C.  -Other screening tests and preventive services for persons with diabetes include: an eye exam to screen for diabetic retinopathy, a kidney function test, a foot exam, and stricter control over your cholesterol.   -Cardiovascular screening for adults with routine risk involves an electrocardiogram (ECG) at intervals determined by your doctor.   -Colorectal cancer screenings should be done for adults age 54-65 with no increased risk factors for colorectal cancer. There are a number of acceptable methods of screening for this type of cancer. Each test has its own benefits and drawbacks. Discuss with your doctor what is most appropriate for you during your annual wellness visit. The different tests include: colonoscopy (considered the best screening method), a fecal occult blood test, a fecal DNA test, and sigmoidoscopy.    -A bone mass density test is recommended when a woman turns 65 to screen for osteoporosis. This test is only recommended one time, as a screening. Some providers will use this same test as a disease monitoring tool if you already have osteoporosis. -Breast cancer screenings are recommended every other year for women of normal risk, age 54-69.  -Cervical cancer screenings for women over age 72 are only recommended with certain risk factors.      Here is a list of your current Health Maintenance items (your personalized list of preventive services) with a due date:  Health Maintenance Due   Topic Date Due    Diabetic Foot Care  Never done    Eye Exam  Never done    DTaP/Tdap/Td  (1 - Tdap) Never done    Shingles Vaccine (1 of 2) Never done    Bone Mineral Density   Never done    Pneumococcal Vaccine (1 of 1 - PPSV23) Never done    Annual Well Visit  Never done    Yearly Flu Vaccine (1) Never done    COVID-19 Vaccine (3 - Booster for [employer] series) 09/03/2021

## 2021-11-18 ENCOUNTER — TELEPHONE (OUTPATIENT)
Dept: INTERNAL MEDICINE CLINIC | Age: 81
End: 2021-11-18

## 2021-11-18 NOTE — TELEPHONE ENCOUNTER
Patient aware pharmacy should have her prescription for metformin 4 tabs. Patient verbalizes understanding.

## 2021-11-18 NOTE — TELEPHONE ENCOUNTER
Patient stating when she got home with the Metform the pharmacy had only given her enough to take 1 a day but she is supposed to be taking 4 a day.

## 2021-12-14 ENCOUNTER — APPOINTMENT (OUTPATIENT)
Dept: INTERNAL MEDICINE CLINIC | Age: 81
End: 2021-12-14

## 2021-12-14 ENCOUNTER — HOSPITAL ENCOUNTER (OUTPATIENT)
Dept: LAB | Age: 81
Discharge: HOME | End: 2021-12-14
Payer: MEDICARE

## 2021-12-14 DIAGNOSIS — R94.4 DECREASED GFR: ICD-10-CM

## 2021-12-14 LAB
ALBUMIN SERPL-MCNC: 3.6 G/DL (ref 3.4–5)
ALBUMIN/GLOB SERPL: 1.2 {RATIO} (ref 0.8–1.7)
ALP SERPL-CCNC: 99 U/L (ref 45–117)
ALT SERPL-CCNC: 17 U/L (ref 13–56)
ANION GAP SERPL CALC-SCNC: 4 MMOL/L (ref 3–18)
AST SERPL-CCNC: 13 U/L (ref 10–38)
BILIRUB SERPL-MCNC: 0.6 MG/DL (ref 0.2–1)
BUN SERPL-MCNC: 16 MG/DL (ref 7–18)
BUN/CREAT SERPL: 18 (ref 12–20)
CALCIUM SERPL-MCNC: 8.8 MG/DL (ref 8.5–10.1)
CHLORIDE SERPL-SCNC: 102 MMOL/L (ref 100–111)
CO2 SERPL-SCNC: 32 MMOL/L (ref 21–32)
CREAT SERPL-MCNC: 0.91 MG/DL (ref 0.6–1.3)
GLOBULIN SER CALC-MCNC: 3.1 G/DL (ref 2–4)
GLUCOSE SERPL-MCNC: 171 MG/DL (ref 74–99)
POTASSIUM SERPL-SCNC: 4.1 MMOL/L (ref 3.5–5.5)
PROT SERPL-MCNC: 6.7 G/DL (ref 6.4–8.2)
SODIUM SERPL-SCNC: 138 MMOL/L (ref 136–145)

## 2021-12-14 PROCEDURE — 36415 COLL VENOUS BLD VENIPUNCTURE: CPT

## 2021-12-14 PROCEDURE — 80053 COMPREHEN METABOLIC PANEL: CPT

## 2021-12-15 ENCOUNTER — TELEPHONE (OUTPATIENT)
Dept: INTERNAL MEDICINE CLINIC | Age: 81
End: 2021-12-15

## 2021-12-15 NOTE — TELEPHONE ENCOUNTER
Her kidney function is back to normal, continue to drink enough fluids and avoid any over-the-counter pain meds other than Tylenol (so avoid Motrin, ibuprofen Aleve, etc.).

## 2021-12-15 NOTE — TELEPHONE ENCOUNTER
Left name and call back number. The call was to inform her of below message. Her kidney function is back to normal, continue to drink enough fluids and avoid any over-the-counter pain meds other than Tylenol (so avoid Motrin, ibuprofen Aleve, etc.).          Documentation

## 2022-01-07 RX ORDER — SIMVASTATIN 20 MG/1
20 TABLET, FILM COATED ORAL
Qty: 90 TABLET | Refills: 1 | Status: SHIPPED | OUTPATIENT
Start: 2022-01-07 | End: 2022-07-11 | Stop reason: SDUPTHER

## 2022-01-24 RX ORDER — LISINOPRIL 2.5 MG/1
2.5 TABLET ORAL DAILY
Qty: 90 TABLET | Refills: 1 | Status: SHIPPED | OUTPATIENT
Start: 2022-01-24 | End: 2022-08-03 | Stop reason: SDUPTHER

## 2022-01-24 NOTE — TELEPHONE ENCOUNTER
Last Visit: 11/12/21 with MD Jeff Fiore  Next Appointment: 2/18/22 with MD Jeff Fiore  Previous Refill Encounter(s): 7/26/21 #90 with 1 refill    Requested Prescriptions     Pending Prescriptions Disp Refills    lisinopriL (PRINIVIL, ZESTRIL) 2.5 mg tablet 90 Tablet 1     Sig: Take 1 Tablet by mouth daily.

## 2022-01-28 RX ORDER — LEVOTHYROXINE SODIUM 125 UG/1
125 TABLET ORAL
Qty: 90 TABLET | Refills: 1 | Status: SHIPPED | OUTPATIENT
Start: 2022-01-28 | End: 2022-08-03 | Stop reason: SDUPTHER

## 2022-01-28 RX ORDER — CLOPIDOGREL BISULFATE 75 MG/1
75 TABLET ORAL DAILY
Qty: 90 TABLET | Refills: 1 | Status: SHIPPED | OUTPATIENT
Start: 2022-01-28 | End: 2022-08-03 | Stop reason: SDUPTHER

## 2022-01-28 NOTE — TELEPHONE ENCOUNTER
Last Visit: 11/12/21 with MD Lunda Babinski  Next Appointment: 2/18/22 with MD Lunda Babinski  Previous Refill Encounter(s): 7/26/21 90 d/s with 1 refill    Requested Prescriptions     Pending Prescriptions Disp Refills    clopidogreL (Plavix) 75 mg tab 90 Tablet 1     Sig: Take 1 Tablet by mouth daily.  levothyroxine (Synthroid) 125 mcg tablet 90 Tablet 1     Sig: Take 1 Tablet by mouth Daily (before breakfast).

## 2022-01-31 ENCOUNTER — TELEPHONE (OUTPATIENT)
Dept: INTERNAL MEDICINE CLINIC | Age: 82
End: 2022-01-31

## 2022-01-31 DIAGNOSIS — E11.65 TYPE 2 DIABETES MELLITUS WITH HYPERGLYCEMIA, WITHOUT LONG-TERM CURRENT USE OF INSULIN (HCC): ICD-10-CM

## 2022-01-31 RX ORDER — METFORMIN HYDROCHLORIDE 500 MG/1
TABLET, EXTENDED RELEASE ORAL
Qty: 360 TABLET | Refills: 1 | Status: SHIPPED | OUTPATIENT
Start: 2022-01-31 | End: 2022-08-05 | Stop reason: SDUPTHER

## 2022-01-31 NOTE — TELEPHONE ENCOUNTER
Pt says there is a mixup on her metformin. Please calls. Says pharmacy doesn't have the 4 a day on metformin

## 2022-03-01 ENCOUNTER — OFFICE VISIT (OUTPATIENT)
Dept: INTERNAL MEDICINE CLINIC | Age: 82
End: 2022-03-01
Payer: MEDICARE

## 2022-03-01 ENCOUNTER — HOSPITAL ENCOUNTER (OUTPATIENT)
Dept: LAB | Age: 82
Discharge: HOME | End: 2022-03-01
Payer: MEDICARE

## 2022-03-01 VITALS
TEMPERATURE: 97.5 F | RESPIRATION RATE: 18 BRPM | DIASTOLIC BLOOD PRESSURE: 68 MMHG | BODY MASS INDEX: 26.61 KG/M2 | HEIGHT: 66 IN | WEIGHT: 165.6 LBS | HEART RATE: 84 BPM | SYSTOLIC BLOOD PRESSURE: 135 MMHG | OXYGEN SATURATION: 96 %

## 2022-03-01 DIAGNOSIS — E11.65 TYPE 2 DIABETES MELLITUS WITH HYPERGLYCEMIA, WITHOUT LONG-TERM CURRENT USE OF INSULIN (HCC): Primary | ICD-10-CM

## 2022-03-01 DIAGNOSIS — J45.20 MILD INTERMITTENT ASTHMA WITHOUT COMPLICATION: ICD-10-CM

## 2022-03-01 DIAGNOSIS — I10 ESSENTIAL HYPERTENSION: ICD-10-CM

## 2022-03-01 DIAGNOSIS — R30.0 DYSURIA: ICD-10-CM

## 2022-03-01 LAB
BILIRUB UR QL STRIP: NEGATIVE
GLUCOSE UR-MCNC: NORMAL MG/DL
HBA1C MFR BLD HPLC: 8.5 %
KETONES P FAST UR STRIP-MCNC: NEGATIVE MG/DL
PH UR STRIP: 5 [PH] (ref 4.6–8)
PROT UR QL STRIP: NEGATIVE
SP GR UR STRIP: 1.02 (ref 1–1.03)
UA UROBILINOGEN AMB POC: NORMAL (ref 0.2–1)
URINALYSIS CLARITY POC: NORMAL
URINALYSIS COLOR POC: YELLOW
URINE BLOOD POC: NORMAL
URINE LEUKOCYTES POC: NORMAL
URINE NITRITES POC: NEGATIVE

## 2022-03-01 PROCEDURE — G8754 DIAS BP LESS 90: HCPCS | Performed by: INTERNAL MEDICINE

## 2022-03-01 PROCEDURE — 81003 URINALYSIS AUTO W/O SCOPE: CPT | Performed by: INTERNAL MEDICINE

## 2022-03-01 PROCEDURE — G8536 NO DOC ELDER MAL SCRN: HCPCS | Performed by: INTERNAL MEDICINE

## 2022-03-01 PROCEDURE — 3052F HG A1C>EQUAL 8.0%<EQUAL 9.0%: CPT | Performed by: INTERNAL MEDICINE

## 2022-03-01 PROCEDURE — G8419 CALC BMI OUT NRM PARAM NOF/U: HCPCS | Performed by: INTERNAL MEDICINE

## 2022-03-01 PROCEDURE — 1090F PRES/ABSN URINE INCON ASSESS: CPT | Performed by: INTERNAL MEDICINE

## 2022-03-01 PROCEDURE — G8510 SCR DEP NEG, NO PLAN REQD: HCPCS | Performed by: INTERNAL MEDICINE

## 2022-03-01 PROCEDURE — 99214 OFFICE O/P EST MOD 30 MIN: CPT | Performed by: INTERNAL MEDICINE

## 2022-03-01 PROCEDURE — G0463 HOSPITAL OUTPT CLINIC VISIT: HCPCS | Performed by: INTERNAL MEDICINE

## 2022-03-01 PROCEDURE — G8752 SYS BP LESS 140: HCPCS | Performed by: INTERNAL MEDICINE

## 2022-03-01 PROCEDURE — 1101F PT FALLS ASSESS-DOCD LE1/YR: CPT | Performed by: INTERNAL MEDICINE

## 2022-03-01 PROCEDURE — 87086 URINE CULTURE/COLONY COUNT: CPT

## 2022-03-01 PROCEDURE — G8400 PT W/DXA NO RESULTS DOC: HCPCS | Performed by: INTERNAL MEDICINE

## 2022-03-01 PROCEDURE — 83036 HEMOGLOBIN GLYCOSYLATED A1C: CPT | Performed by: INTERNAL MEDICINE

## 2022-03-01 PROCEDURE — G8427 DOCREV CUR MEDS BY ELIG CLIN: HCPCS | Performed by: INTERNAL MEDICINE

## 2022-03-01 RX ORDER — CEPHALEXIN 250 MG/1
250 CAPSULE ORAL 3 TIMES DAILY
Qty: 15 CAPSULE | Refills: 0 | Status: SHIPPED | OUTPATIENT
Start: 2022-03-01 | End: 2022-03-06

## 2022-03-01 NOTE — PROGRESS NOTES
HPI     Gae Cabot is here for follow-up of her type 2 diabetes. She reports that she usually only takes 2 Metformin XL daily, since she forgets to take the other 2 in the evening. I advised her to take all the Metformin tablets at the same time, at whatever time of day is the easiest to remember. She is enjoying time with her great-grandchildren. She has not had to use her rescue inhaler in a long time, but we discussed always carrying it with her. Past Medical History:   Diagnosis Date    Acquired hypothyroidism 5/7/2021    Asthma     seasonal    Cancer ([facility] [address]     R breast cancer mestatized to liver 2000    Coronary artery disease involving native artery of transplanted heart without angina pectoris     History of TIAs     short term memory    Hypertension     TIA (transient ischemic attack) last one 2013    h/o TIA's    Type 2 diabetes mellitus treated without insulin ([facility] [address] 5/7/2021        Past Surgical History:   Procedure Laterality Date    CARDIAC CATHETERIZATION  3/10/2016         CORONARY ARTERY ANGIOGRAM  3/10/2016         HX CORONARY ARTERY BYPASS GRAFT      HX ERCP  4/29/15    gallstone     HX ORTHOPAEDIC      R hip & femur     HX OTHER SURGICAL      endometrial biopsy-- normal     HX TONSILLECTOMY      LV ANGIOGRAPHY  3/10/2016         NE BREAST SURGERY PROCEDURE UNLISTED      R breast mastectomy . Dr. Yunior Cole        Current Outpatient Medications   Medication Sig Dispense Refill    cephALEXin (KEFLEX) 250 mg capsule Take 1 Capsule by mouth three (3) times daily for 5 days. 15 Capsule 0    metFORMIN ER (GLUCOPHAGE XR) 500 mg tablet 4 daily new dose ignore recent prescription for 1 daily 360 Tablet 1    clopidogreL (Plavix) 75 mg tab Take 1 Tablet by mouth daily. 90 Tablet 1    levothyroxine (Synthroid) 125 mcg tablet Take 1 Tablet by mouth Daily (before breakfast). 90 Tablet 1    lisinopriL (PRINIVIL, ZESTRIL) 2.5 mg tablet Take 1 Tablet by mouth daily.  90 Tablet 1  simvastatin (ZOCOR) 20 mg tablet Take 1 Tablet by mouth nightly. 90 Tablet 1    ergocalciferol (ERGOCALCIFEROL) 1,250 mcg (50,000 unit) capsule Take 1 Capsule by mouth every seven (7) days. 12 Capsule 1    metoprolol succinate (Toprol XL) 50 mg XL tablet Take 1 Tablet by mouth daily. 90 Tablet 1    aspirin delayed-release 162 mg tablet Take 1 Tab by mouth daily. 30 Tab 1    albuterol (PROVENTIL HFA, VENTOLIN HFA, PROAIR HFA) 90 mcg/actuation inhaler Take  by inhalation.  fluticasone (FLOVENT HFA) 110 mcg/actuation inhaler Take 2 Puffs by inhalation every twelve (12) hours as needed. Allergies   Allergen Reactions    Dilaudid [Hydromorphone] Nausea and Vomiting    Pollen Extracts Sneezing        Social History     Socioeconomic History    Marital status:      Spouse name: Not on file    Number of children: Not on file    Years of education: Not on file    Highest education level: Not on file   Occupational History    Not on file   Tobacco Use    Smoking status: Never Smoker    Smokeless tobacco: Never Used   Vaping Use    Vaping Use: Never used   Substance and Sexual Activity    Alcohol use: No    Drug use: No    Sexual activity: Not on file   Other Topics Concern    Not on file   Social History Narrative    Not on file     Social Determinants of Health     Financial Resource Strain:     Difficulty of Paying Living Expenses: Not on file   Food Insecurity:     Worried About Running Out of Food in the Last Year: Not on file    Marquez of Food in the Last Year: Not on file   Transportation Needs:     Lack of Transportation (Medical): Not on file    Lack of Transportation (Non-Medical):  Not on file   Physical Activity:     Days of Exercise per Week: Not on file    Minutes of Exercise per Session: Not on file   Stress:     Feeling of Stress : Not on file   Social Connections:     Frequency of Communication with Friends and Family: Not on file    Frequency of Social Gatherings with Friends and Family: Not on file    Attends Adventism Services: Not on file    Active Member of Clubs or Organizations: Not on file    Attends Club or Organization Meetings: Not on file    Marital Status: Not on file   Intimate Partner Violence:     Fear of Current or Ex-Partner: Not on file    Emotionally Abused: Not on file    Physically Abused: Not on file    Sexually Abused: Not on file   Housing Stability:     Unable to Pay for Housing in the Last Year: Not on file    Number of [place] in the Last Year: Not on file    Unstable Housing in the Last Year: Not on file        Family History   Problem Relation Age of Onset    Stroke Mother     Heart Attack Father         at 64    Breast Cancer Sister     High Cholesterol Son     Thyroid Disease Son         hypothyroidism           Visit Vitals  /68 (BP 1 Location: Left upper arm, BP Patient Position: Sitting, BP Cuff Size: Large adult)   Pulse 84   Temp 97.5 °F (36.4 °C) (Temporal)   Resp 18   Ht 5' 6\" (1.676 m)   Wt 165 lb 9.6 oz (75.1 kg)   SpO2 96%   BMI 26.73 kg/m²        Review of Systems   Constitutional: Negative for chills and fever. Eyes: Negative for blurred vision. Respiratory: Negative for shortness of breath. Cardiovascular: Negative for chest pain. Gastrointestinal: Negative for blood in stool. Genitourinary: Positive for dysuria and frequency. Negative for hematuria. Neurological: Negative for headaches. Psychiatric/Behavioral: Negative for depression. The patient is not nervous/anxious. Physical Exam  Constitutional:       General: She is not in acute distress. Appearance: She is not ill-appearing. Eyes:      General: No scleral icterus. Conjunctiva/sclera: Conjunctivae normal.   Cardiovascular:      Rate and Rhythm: Normal rate and regular rhythm. Pulses: Normal pulses. Heart sounds: No murmur heard. No friction rub. No gallop.     Pulmonary:      Effort: Pulmonary effort is normal.      Breath sounds: Normal breath sounds. Abdominal:      Palpations: Abdomen is soft. Tenderness: There is no abdominal tenderness. Musculoskeletal:      Cervical back: Neck supple. Comments: No clubbing, cyanosis, or edema. Skin:     General: Skin is warm and dry. Neurological:      Mental Status: She is alert and oriented to person, place, and time. Comments: Foot sensation intact to monofilament testing. Psychiatric:         Mood and Affect: Mood normal.                  Diagnoses and all orders for this visit:    1. Type 2 diabetes mellitus with hyperglycemia, without long-term current use of insulin ([facility])  Comments:  Is taking only 2 Metformin XL on most days, encouraged to take all 4 once daily to improve compliance. Fingerstick A1c next visit. Orders:  -     AMB POC HEMOGLOBIN A1C    2. Dysuria  Comments: With pyuria, check culture. Empiric Keflex 250 mg 3 times daily x5 days. Orders:  -     AMB POC URINALYSIS DIP STICK AUTO W/O MICRO  -     cephALEXin (KEFLEX) 250 mg capsule; Take 1 Capsule by mouth three (3) times daily for 5 days. -     CULTURE, URINE; Future    3. Essential hypertension  Comments:  In good range on my check, continue medication    4. Mild intermittent asthma without complication  Comments:  Controlled, always carry rescue inhaler. Follow-up and Dispositions    · Return in about 3 months (around 6/1/2022) for DM.               Concha Bedolla MD

## 2022-03-01 NOTE — PROGRESS NOTES
Jyothi Kaplan presents today for   Chief Complaint   Patient presents with    Diabetes       Is someone accompanying this pt? no    Is the patient using any DME equipment during OV? n0    Depression Screening:  3 most recent PHQ Screens 3/1/2022   Little interest or pleasure in doing things Not at all   Feeling down, depressed, irritable, or hopeless Not at all   Total Score PHQ 2 0       Learning Assessment:  Learning Assessment 5/13/2015   PRIMARY LEARNER Patient   PRIMARY LANGUAGE ENGLISH   LEARNER PREFERENCE PRIMARY LISTENING   ANSWERED BY patient   RELATIONSHIP SELF       Abuse Screening:  Abuse Screening Questionnaire 3/1/2022   Do you ever feel afraid of your partner? N   Are you in a relationship with someone who physically or mentally threatens you? N   Is it safe for you to go home? Y       Fall Risk  Fall Risk Assessment, last 12 mths 3/1/2022   Able to walk? Yes   Fall in past 12 months? 0   Do you feel unsteady? 0   Are you worried about falling 0   Is TUG test greater than 12 seconds? -   Is the gait abnormal? -   Number of falls in past 12 months -       ADL  ADL Assessment 5/7/2021   Feeding yourself No Help Needed   Getting from bed to chair No Help Needed   Getting dressed No Help Needed   Bathing or showering No Help Needed   Walk across the room (includes cane/walker) No Help Needed   Using the telphone No Help Needed   Taking your medications No Help Needed   Preparing meals No Help Needed   Managing money (expenses/bills) No Help Needed   Moderately strenuous housework (laundry) No Help Needed   Shopping for personal items (toiletries/medicines) No Help Needed   Shopping for groceries No Help Needed   Driving No Help Needed   Climbing a flight of stairs No Help Needed   Getting to places beyond walking distances No Help Needed       Health Maintenance reviewed and discussed and ordered per Provider.     Health Maintenance Due   Topic Date Due    Foot Exam Q1  Never done   [facility] Eye Exam Retinal or Dilated  Never done    DTaP/Tdap/Td series (1 - Tdap) Never done    Shingrix Vaccine Age 50> (1 of 2) Never done    Bone Densitometry (Dexa) Screening  Never done    Pneumococcal 65+ years (2 of 2 - PPSV23) 05/16/2019    COVID-19 Vaccine (3 - Booster for [employer] series) 08/03/2021   . Coordination of Care:  1. Have you been to the ER, urgent care clinic since your last visit? Hospitalized since your last visit? no    2. Have you seen or consulted any other health care providers outside of the [address] [place] [address] since your last visit? Include any pap smears or colon screening. no    3. For patients aged 39-70: Has the patient had a colonoscopy? NA - based on age     If the patient is female:    4. For patients aged 41-77: Has the patient had a mammogram within the past 2 years? NA - based on age    11. For patients aged 21-65: Has the patient had a pap smear?  NA - based on age

## 2022-03-02 DIAGNOSIS — M54.16 CHRONIC LEFT-SIDED LUMBAR RADICULOPATHY: Primary | ICD-10-CM

## 2022-03-02 RX ORDER — LIDOCAINE 50 MG/G
PATCH TOPICAL
Qty: 30 EACH | Refills: 2 | Status: SHIPPED | OUTPATIENT
Start: 2022-03-02

## 2022-03-03 ENCOUNTER — TELEPHONE (OUTPATIENT)
Dept: INTERNAL MEDICINE CLINIC | Age: 82
End: 2022-03-03

## 2022-03-03 ENCOUNTER — DOCUMENTATION ONLY (OUTPATIENT)
Dept: INTERNAL MEDICINE CLINIC | Age: 82
End: 2022-03-03

## 2022-03-03 LAB
BACTERIA SPEC CULT: NORMAL
CC UR VC: NORMAL
SERVICE CMNT-IMP: NORMAL

## 2022-03-03 NOTE — TELEPHONE ENCOUNTER
Her urine some mixed bacteria, unclear if it is a UTI, and if so, if it is sensitive to the antibiotic I gave her. Other than frequent urination, is she having any burning or blood in her urine?

## 2022-03-03 NOTE — TELEPHONE ENCOUNTER
Spoke with patient she denies blood in the urine or any burning or discomfort. She states she only had the increased frequency and she is not sure if that was more related to her prolapsed bladder. She has take 24 hours of antibiotics and feels like the frequency is less. She will continue the antibiotics and will notify us if symptoms worsen.

## 2022-03-19 PROBLEM — E03.9 ACQUIRED HYPOTHYROIDISM: Status: ACTIVE | Noted: 2021-05-07

## 2022-03-19 PROBLEM — E11.9 TYPE 2 DIABETES MELLITUS TREATED WITHOUT INSULIN (HCC): Status: ACTIVE | Noted: 2021-05-07

## 2022-05-24 RX ORDER — METOPROLOL SUCCINATE 50 MG/1
50 TABLET, EXTENDED RELEASE ORAL DAILY
Qty: 90 TABLET | Refills: 3 | Status: SHIPPED | OUTPATIENT
Start: 2022-05-24

## 2022-05-24 NOTE — TELEPHONE ENCOUNTER
Last Visit: 3/1/22 with MD Kirill Erazo  Next Appointment: 6/2/22 with MD Kirill Erazo  Previous Refill Encounter(s): 11/11/21 #90 with 1 refill    Requested Prescriptions     Pending Prescriptions Disp Refills    metoprolol succinate (Toprol XL) 50 mg XL tablet 90 Tablet 3     Sig: Take 1 Tablet by mouth daily.

## 2022-05-26 DIAGNOSIS — M81.0 AGE-RELATED OSTEOPOROSIS WITHOUT CURRENT PATHOLOGICAL FRACTURE: ICD-10-CM

## 2022-05-26 RX ORDER — ERGOCALCIFEROL 1.25 MG/1
50000 CAPSULE ORAL
Qty: 12 CAPSULE | Refills: 1 | Status: SHIPPED | OUTPATIENT
Start: 2022-05-26 | End: 2022-09-15 | Stop reason: SDUPTHER

## 2022-05-26 NOTE — TELEPHONE ENCOUNTER
Last Visit: 3/1/22 with MD Yonathan Ferreira  Next Appointment: 6/2/22 with MD Yonathan Ferreira  Previous Refill Encounter(s): 11/11/21 #12 with 1 refill    Requested Prescriptions     Pending Prescriptions Disp Refills    ergocalciferol (ERGOCALCIFEROL) 1,250 mcg (50,000 unit) capsule 12 Capsule 1     Sig: Take 1 Capsule by mouth every seven (7) days.

## 2022-07-11 RX ORDER — SIMVASTATIN 20 MG/1
20 TABLET, FILM COATED ORAL
Qty: 90 TABLET | Refills: 1 | Status: SHIPPED | OUTPATIENT
Start: 2022-07-11

## 2022-07-11 NOTE — TELEPHONE ENCOUNTER
Last Visit: 3/1/22 with MD Rah Marsh  Next Appointment: Advised to follow-up in 3 months  Previous Refill Encounter(s): 1/7/22 #90 with 1 refill    Requested Prescriptions     Pending Prescriptions Disp Refills    simvastatin (ZOCOR) 20 mg tablet 90 Tablet 1     Sig: Take 1 Tablet by mouth nightly.          For Endy Clulen in place:    Recommendation Provided To:    Intervention Detail: New Rx: 1, reason: Patient Preference   Gap Closed?:    Intervention Accepted By:   Justin Lee Time Spent (min): 5

## 2022-08-03 RX ORDER — LEVOTHYROXINE SODIUM 125 UG/1
125 TABLET ORAL DAILY
Qty: 90 TABLET | Refills: 1 | Status: SHIPPED | OUTPATIENT
Start: 2022-08-03

## 2022-08-03 RX ORDER — CLOPIDOGREL BISULFATE 75 MG/1
TABLET ORAL
Qty: 90 TABLET | Refills: 1 | Status: SHIPPED | OUTPATIENT
Start: 2022-08-03

## 2022-08-03 RX ORDER — LISINOPRIL 2.5 MG/1
TABLET ORAL
Qty: 90 TABLET | Refills: 1 | Status: SHIPPED | OUTPATIENT
Start: 2022-08-03

## 2022-08-03 NOTE — TELEPHONE ENCOUNTER
Last Visit: 3/1/22 with MD Stan Mayer  Next Appointment: 6/2/22 pt cancelled appt  Previous Refill Encounter(s): 1/28/22 Plavix & Synthroid #90 with 1 refill, 1/24/22 Prinivil #90 with 1 refill    Requested Prescriptions     Pending Prescriptions Disp Refills    levothyroxine (Synthroid) 125 mcg tablet 90 Tablet 3     Sig: Take 1 Tablet by mouth Daily (before breakfast). lisinopriL (PRINIVIL, ZESTRIL) 2.5 mg tablet 90 Tablet 3     Sig: Take 1 tablet by mouth daily    clopidogreL (Plavix) 75 mg tab 90 Tablet 3     Sig: Take 1 tablet by mouth daily         For Pharmacy [facility] in place:   Recommendation Provided To:    Intervention Detail: New Rx: 3, reason: Patient Preference  Gap Closed?:   Intervention Accepted By:   Time Spent (min): 5

## 2022-08-05 DIAGNOSIS — E11.65 TYPE 2 DIABETES MELLITUS WITH HYPERGLYCEMIA, WITHOUT LONG-TERM CURRENT USE OF INSULIN (HCC): ICD-10-CM

## 2022-08-05 RX ORDER — METFORMIN HYDROCHLORIDE 500 MG/1
TABLET, EXTENDED RELEASE ORAL
Qty: 360 TABLET | Refills: 0 | Status: SHIPPED | OUTPATIENT
Start: 2022-08-05

## 2022-08-05 NOTE — TELEPHONE ENCOUNTER
Last Visit: 3/1/22 with MD Anila Kumari  Next Appointment: 6/2/22 pt cancelled appt  Previous Refill Encounter(s): 1/31/22 #360 with 1 refill    Requested Prescriptions     Pending Prescriptions Disp Refills    metFORMIN ER (GLUCOPHAGE XR) 500 mg tablet 360 Tablet 1     Sig: Take 4 tablets by mouth once daily         For Pharmacy [address] [facility] in place:   Recommendation Provided To:    Intervention Detail: New Rx: 1, reason: Patient Preference  Gap Closed?:   Intervention Accepted By:   Time Spent (min): 5

## 2022-09-15 ENCOUNTER — TELEPHONE (OUTPATIENT)
Dept: INTERNAL MEDICINE CLINIC | Age: 82
End: 2022-09-15

## 2022-09-15 ENCOUNTER — OFFICE VISIT (OUTPATIENT)
Dept: INTERNAL MEDICINE CLINIC | Age: 82
End: 2022-09-15
Payer: MEDICARE

## 2022-09-15 VITALS
BODY MASS INDEX: 26.52 KG/M2 | OXYGEN SATURATION: 96 % | TEMPERATURE: 96.7 F | HEART RATE: 63 BPM | RESPIRATION RATE: 18 BRPM | WEIGHT: 165 LBS | HEIGHT: 66 IN | DIASTOLIC BLOOD PRESSURE: 72 MMHG | SYSTOLIC BLOOD PRESSURE: 138 MMHG

## 2022-09-15 DIAGNOSIS — E11.65 TYPE 2 DIABETES MELLITUS WITH HYPERGLYCEMIA, WITHOUT LONG-TERM CURRENT USE OF INSULIN (HCC): Primary | ICD-10-CM

## 2022-09-15 DIAGNOSIS — M81.0 AGE-RELATED OSTEOPOROSIS WITHOUT CURRENT PATHOLOGICAL FRACTURE: ICD-10-CM

## 2022-09-15 DIAGNOSIS — I10 ESSENTIAL HYPERTENSION: ICD-10-CM

## 2022-09-15 DIAGNOSIS — E03.9 ACQUIRED HYPOTHYROIDISM: ICD-10-CM

## 2022-09-15 DIAGNOSIS — R60.0 EDEMA OF RIGHT LOWER EXTREMITY: ICD-10-CM

## 2022-09-15 DIAGNOSIS — I25.811 CORONARY ARTERY DISEASE INVOLVING NATIVE ARTERY OF TRANSPLANTED HEART WITHOUT ANGINA PECTORIS: ICD-10-CM

## 2022-09-15 LAB — HBA1C MFR BLD HPLC: 7.7 %

## 2022-09-15 PROCEDURE — 1124F ACP DISCUSS-NO DSCNMKR DOCD: CPT | Performed by: INTERNAL MEDICINE

## 2022-09-15 PROCEDURE — 1101F PT FALLS ASSESS-DOCD LE1/YR: CPT | Performed by: INTERNAL MEDICINE

## 2022-09-15 PROCEDURE — G0463 HOSPITAL OUTPT CLINIC VISIT: HCPCS | Performed by: INTERNAL MEDICINE

## 2022-09-15 PROCEDURE — G8427 DOCREV CUR MEDS BY ELIG CLIN: HCPCS | Performed by: INTERNAL MEDICINE

## 2022-09-15 PROCEDURE — G8536 NO DOC ELDER MAL SCRN: HCPCS | Performed by: INTERNAL MEDICINE

## 2022-09-15 PROCEDURE — G8417 CALC BMI ABV UP PARAM F/U: HCPCS | Performed by: INTERNAL MEDICINE

## 2022-09-15 PROCEDURE — G8510 SCR DEP NEG, NO PLAN REQD: HCPCS | Performed by: INTERNAL MEDICINE

## 2022-09-15 PROCEDURE — 3051F HG A1C>EQUAL 7.0%<8.0%: CPT | Performed by: INTERNAL MEDICINE

## 2022-09-15 PROCEDURE — 83036 HEMOGLOBIN GLYCOSYLATED A1C: CPT | Performed by: INTERNAL MEDICINE

## 2022-09-15 PROCEDURE — 99214 OFFICE O/P EST MOD 30 MIN: CPT | Performed by: INTERNAL MEDICINE

## 2022-09-15 PROCEDURE — G8754 DIAS BP LESS 90: HCPCS | Performed by: INTERNAL MEDICINE

## 2022-09-15 PROCEDURE — 1090F PRES/ABSN URINE INCON ASSESS: CPT | Performed by: INTERNAL MEDICINE

## 2022-09-15 PROCEDURE — G8752 SYS BP LESS 140: HCPCS | Performed by: INTERNAL MEDICINE

## 2022-09-15 RX ORDER — ERGOCALCIFEROL 1.25 MG/1
50000 CAPSULE ORAL
Qty: 12 CAPSULE | Refills: 1 | Status: SHIPPED | OUTPATIENT
Start: 2022-09-15

## 2022-09-15 NOTE — PROGRESS NOTES
Hieu Shaw presents today for   Chief Complaint   Patient presents with    Diabetes       Is someone accompanying this pt? no    Is the patient using any DME equipment during OV? no    Depression Screening:  3 most recent PHQ Screens 9/15/2022   Little interest or pleasure in doing things Not at all   Feeling down, depressed, irritable, or hopeless Not at all   Total Score PHQ 2 0       Learning Assessment:  Learning Assessment 5/13/2015   PRIMARY LEARNER Patient   PRIMARY LANGUAGE ENGLISH   LEARNER PREFERENCE PRIMARY LISTENING   ANSWERED BY patient   RELATIONSHIP SELF       Abuse Screening:  Abuse Screening Questionnaire 9/15/2022   Do you ever feel afraid of your partner? N   Are you in a relationship with someone who physically or mentally threatens you? N   Is it safe for you to go home? Y       Fall Risk  Fall Risk Assessment, last 12 mths 9/15/2022   Able to walk? Yes   Fall in past 12 months? 0   Do you feel unsteady? 0   Are you worried about falling 0   Is TUG test greater than 12 seconds? -   Is the gait abnormal? -   Number of falls in past 12 months -       ADL  ADL Assessment 5/7/2021   Feeding yourself No Help Needed   Getting from bed to chair No Help Needed   Getting dressed No Help Needed   Bathing or showering No Help Needed   Walk across the room (includes cane/walker) No Help Needed   Using the telphone No Help Needed   Taking your medications No Help Needed   Preparing meals No Help Needed   Managing money (expenses/bills) No Help Needed   Moderately strenuous housework (laundry) No Help Needed   Shopping for personal items (toiletries/medicines) No Help Needed   Shopping for groceries No Help Needed   Driving No Help Needed   Climbing a flight of stairs No Help Needed   Getting to places beyond walking distances No Help Needed       Health Maintenance reviewed and discussed and ordered per Provider.     Health Maintenance Due   Topic Date Due    Pneumococcal 65+ years (1 - PCV) Never done    Eye Exam Retinal or Dilated  Never done    DTaP/Tdap/Td series (1 - Tdap) Never done    Shingrix Vaccine Age 50> (1 of 2) Never done    Bone Densitometry (Dexa) Screening  Never done    COVID-19 Vaccine (3 - Booster for Pfizer series) 08/03/2021    MICROALBUMIN Q1  05/10/2022    Lipid Screen  05/10/2022    Flu Vaccine (1) 09/01/2022   . Coordination of Care:  1. Have you been to the ER, urgent care clinic since your last visit? Hospitalized since your last visit? no    2. Have you seen or consulted any other health care providers outside of the [address] [place] [address] since your last visit? Include any pap smears or colon screening. no    3. For patients aged 39-70: Has the patient had a colonoscopy? NA - based on age     If the patient is female:    4. For patients aged 41-77: Has the patient had a mammogram within the past 2 years? NA - based on age    11. For patients aged 21-65: Has the patient had a pap smear?  NA - based on age

## 2022-09-15 NOTE — PROGRESS NOTES
BLANKA     Nanette Castellanos is here to follow-up on her type 2 diabetes. She is having a hard time fitting in for metformin daily, states it is because she has so many pills to take. She does not report any GI distress with it, and I asked her to try to take 4 a day, even if she has to take it in divided doses. On questioning, she is interested in diabetes education classes, and we will refer her to those. She notes longstanding right lower extremity edema, worse at the end of the day, worse in hot weather. That is the site of her saphenous vein graft for her previous bypass surgery. She still takes care of her grandchildren, and enjoys this. Past Medical History:   Diagnosis Date    Acquired hypothyroidism 5/7/2021    Asthma     seasonal    Cancer ([facility] [address]     R breast cancer mestatized to liver 2000    Coronary artery disease involving native artery of transplanted heart without angina pectoris     History of TIAs     short term memory    Hypertension     TIA (transient ischemic attack) last one 2013    h/o TIA's    Type 2 diabetes mellitus treated without insulin ([facility] [address] 5/7/2021        Past Surgical History:   Procedure Laterality Date    CARDIAC CATHETERIZATION  3/10/2016         CORONARY ARTERY ANGIOGRAM  3/10/2016         HX CORONARY ARTERY BYPASS GRAFT      HX ERCP  4/29/15    [facility]     HX ORTHOPAEDIC      R hip & femur     HX OTHER SURGICAL      endometrial biopsy-- normal     HX TONSILLECTOMY      LV ANGIOGRAPHY  3/10/2016         TX BREAST SURGERY PROCEDURE UNLISTED      R breast mastectomy . Dr. Davion Phipps        Current Outpatient Medications   Medication Sig Dispense Refill    ergocalciferol (ERGOCALCIFEROL) 1,250 mcg (50,000 unit) capsule Take 1 Capsule by mouth every seven (7) days. 12 Capsule 1    metFORMIN ER (GLUCOPHAGE XR) 500 mg tablet Take 4 tablets by mouth once daily 360 Tablet 0    levothyroxine (Synthroid) 125 mcg tablet Take 1 Tablet by mouth in the morning.  80 Tablet 1    lisinopriL (PRINIVIL, ZESTRIL) 2.5 mg tablet Take 1 tablet by mouth daily 90 Tablet 1    clopidogreL (Plavix) 75 mg tab Take 1 tablet by mouth daily 90 Tablet 1    simvastatin (ZOCOR) 20 mg tablet Take 1 Tablet by mouth nightly. 90 Tablet 1    metoprolol succinate (Toprol XL) 50 mg XL tablet Take 1 Tablet by mouth daily. 90 Tablet 3    lidocaine (LIDODERM) 5 % 1-3 patches to painful areas for 12 hours. Remove and discard after 12 hours. 12 hours later, may place new patch(es). 30 Each 2    aspirin delayed-release 162 mg tablet Take 1 Tab by mouth daily. 30 Tab 1    albuterol (PROVENTIL HFA, VENTOLIN HFA, PROAIR HFA) 90 mcg/actuation inhaler Take  by inhalation.  fluticasone (FLOVENT HFA) 110 mcg/actuation inhaler Take 2 Puffs by inhalation every twelve (12) hours as needed.           Allergies   Allergen Reactions    Dilaudid [Hydromorphone] Nausea and Vomiting    Pollen Extracts Sneezing        Social History     Socioeconomic History    Marital status:      Spouse name: Not on file    Number of children: Not on file    Years of education: Not on file    Highest education level: Not on file   Occupational History    Not on file   Tobacco Use    Smoking status: Never    Smokeless tobacco: Never   Vaping Use    Vaping Use: Never used   Substance and Sexual Activity    Alcohol use: No    Drug use: No    Sexual activity: Not on file   Other Topics Concern    Not on file   Social History Narrative    Not on file     Social Determinants of Health     Financial Resource Strain: Not on file   Food Insecurity: Not on file   Transportation Needs: Not on file   Physical Activity: Not on file   Stress: Not on file   Social Connections: Not on file   Intimate Partner Violence: Not on file   Housing Stability: Not on file        Family History   Problem Relation Age of Onset    Stroke Mother     Heart Attack Father         at 64    Breast Cancer Sister     High Cholesterol Son  Thyroid Disease Son         hypothyroidism           Visit Vitals  /72 (BP 1 Location: Left upper arm, BP Patient Position: Sitting)   Pulse 63   Temp (!) 96.7 °F (35.9 °C) (Temporal)   Resp 18   Ht 5' 6\" (1.676 m)   Wt 165 lb (74.8 kg)   SpO2 96%   BMI 26.63 kg/m²        Review of Systems   Constitutional: Negative. Eyes:  Negative for blurred vision. Respiratory:  Negative for shortness of breath. Cardiovascular:  Negative for chest pain and orthopnea. Gastrointestinal:  Negative for blood in stool. Genitourinary:  Negative for hematuria. Neurological:  Negative for tingling and headaches. Endo/Heme/Allergies:  Negative for polydipsia. Psychiatric/Behavioral:  Negative for depression. The patient is not nervous/anxious. Physical Exam  Constitutional:       General: She is not in acute distress. Eyes:      General: No scleral icterus. Conjunctiva/sclera: Conjunctivae normal.   Neck:      Comments: Bilateral carotid upstrokes normal to palpation. Lymph: No cervical or supraclavicular lymphadenopathy. Cardiovascular:      Rate and Rhythm: Normal rate and regular rhythm. Pulses: Normal pulses. Heart sounds: No friction rub. No gallop. Pulmonary:      Effort: Pulmonary effort is normal.      Breath sounds: Normal breath sounds. Abdominal:      Palpations: Abdomen is soft. Tenderness: There is no abdominal tenderness. Musculoskeletal:      Cervical back: Neck supple. Comments: No clubbing, no cyanosis, calves nontender, no cords. There is mild pitting ankle edema on the right leg. Skin:     General: Skin is warm and dry. Neurological:      Mental Status: She is alert and oriented to person, place, and time. Psychiatric:         Mood and Affect: Mood normal.                Diagnoses and all orders for this visit:    1.  Type 2 diabetes mellitus with hyperglycemia, without long-term current use of insulin ([facility])  Comments:  A1c 7.7% fingerstick, improved, still not at goal under 7. Try to takr 4 metformin daily, refer to diabetes education classes, should start checking her bg  Orders:  -     AMB POC HEMOGLOBIN A1C  -     REFERRAL TO DIABETIC EDUCATION  -     HEMOGLOBIN A1C WITH EAG; Future  -     METABOLIC PANEL, COMPREHENSIVE; Future  -     MICROALBUMIN, UR, RAND W/ MICROALB/CREAT RATIO; Future    2. Essential hypertension  Comments:  Controlled, continue medication. Orders:  -     URINALYSIS W/MICROSCOPIC; Future    3. Edema of right lower extremity  Comments: At site of saphenous vein graft. Discussed likely due to venous insufficiency, no evidence of volume overload,    4. Age-related osteoporosis without current pathological fracture  Comments:  refill supplement, check D level with next labs  Orders:  -     ergocalciferol (ERGOCALCIFEROL) 1,250 mcg (50,000 unit) capsule; Take 1 Capsule by mouth every seven (7) days. -     VITAMIN D, 25 HYDROXY; Future    5. Acquired hypothyroidism  Comments:  Labs with next blood draw. Orders:  -     TSH 3RD GENERATION; Future    6. Coronary artery disease involving native artery of transplanted heart without angina pectoris  Comments:  Fasting lipids with next labs, LDL goal under 70. Orders:  -     LIPID PANEL; Future         Follow-up and Dispositions    Return in about 4 months (around 1/15/2023) for mcwl/acp-- labs one week before.               Poppy Cook MD

## 2022-11-07 DIAGNOSIS — E11.65 TYPE 2 DIABETES MELLITUS WITH HYPERGLYCEMIA, WITHOUT LONG-TERM CURRENT USE OF INSULIN (HCC): ICD-10-CM

## 2022-11-07 RX ORDER — METFORMIN HYDROCHLORIDE 500 MG/1
TABLET, EXTENDED RELEASE ORAL
Qty: 360 TABLET | Refills: 1 | Status: SHIPPED | OUTPATIENT
Start: 2022-11-07

## 2022-11-07 NOTE — TELEPHONE ENCOUNTER
Last appt: 9/15/2022  Future Appointments   Date Time Provider Prieto Mcmillani   1/20/2023  3:20 PM Chris Almonte MD [facility] AMB       Requested Prescriptions     Pending Prescriptions Disp Refills    metFORMIN ER (GLUCOPHAGE XR) 500 mg tablet 360 Tablet 0     Sig: Take 4 tablets by mouth once daily

## 2023-01-27 RX ORDER — SIMVASTATIN 20 MG/1
20 TABLET, FILM COATED ORAL
Qty: 90 TABLET | Refills: 1 | Status: SHIPPED | OUTPATIENT
Start: 2023-01-27

## 2023-01-27 NOTE — TELEPHONE ENCOUNTER
Requested Prescriptions     Pending Prescriptions Disp Refills    simvastatin (ZOCOR) 20 mg tablet 90 Tablet 1     Sig: Take 1 Tablet by mouth nightly. Per pt her pharmacy requested this medication last week.      She is out of medication    Send to [facility].

## 2023-01-31 ENCOUNTER — TELEPHONE (OUTPATIENT)
Dept: INTERNAL MEDICINE CLINIC | Age: 83
End: 2023-01-31

## 2023-02-15 ENCOUNTER — TELEPHONE (OUTPATIENT)
Age: 83
End: 2023-02-15

## 2023-02-15 DIAGNOSIS — I25.811 CORONARY ARTERY DISEASE INVOLVING NATIVE ARTERY OF TRANSPLANTED HEART WITHOUT ANGINA PECTORIS: Primary | ICD-10-CM

## 2023-02-15 RX ORDER — CLOPIDOGREL BISULFATE 75 MG/1
75 TABLET ORAL DAILY
Qty: 90 TABLET | Refills: 1 | Status: SHIPPED | OUTPATIENT
Start: 2023-02-15

## 2023-02-15 NOTE — TELEPHONE ENCOUNTER
clopidogreL (Plavix) 75 mg tab [[id number]]     Order Details  Dose, Route, Frequency: As Directed   Dispense Quantity: 90 Tablet Refills: 1          Sig: Take 1 tablet by mouth daily         Start Date: 08/03/22 End Date: --   Written Date: 08/03/22 Expiration Date: --   Original Order:  clopidogreL (Plavix) 75 mg tab [[id number]]

## 2023-02-17 RX ORDER — FLUTICASONE PROPIONATE 110 UG/1
2 AEROSOL, METERED RESPIRATORY (INHALATION)
COMMUNITY

## 2023-02-17 RX ORDER — LISINOPRIL 2.5 MG/1
TABLET ORAL
COMMUNITY
Start: 2022-08-03 | End: 2023-02-20

## 2023-02-17 RX ORDER — METFORMIN HYDROCHLORIDE 500 MG/1
TABLET, EXTENDED RELEASE ORAL
COMMUNITY
Start: 2022-11-07

## 2023-02-17 RX ORDER — ALBUTEROL SULFATE 90 UG/1
AEROSOL, METERED RESPIRATORY (INHALATION)
COMMUNITY

## 2023-02-17 RX ORDER — LEVOTHYROXINE SODIUM 0.12 MG/1
125 TABLET ORAL DAILY
COMMUNITY
Start: 2022-08-03 | End: 2023-02-20 | Stop reason: SDUPTHER

## 2023-02-17 RX ORDER — CLOPIDOGREL BISULFATE 75 MG/1
TABLET ORAL
COMMUNITY
Start: 2022-08-03

## 2023-02-17 RX ORDER — SIMVASTATIN 20 MG
TABLET ORAL
COMMUNITY
Start: 2023-01-27

## 2023-02-17 RX ORDER — ERGOCALCIFEROL 1.25 MG/1
50000 CAPSULE ORAL
COMMUNITY
Start: 2022-09-15

## 2023-02-17 RX ORDER — METOPROLOL SUCCINATE 50 MG/1
TABLET, EXTENDED RELEASE ORAL
COMMUNITY
Start: 2022-12-01

## 2023-02-17 RX ORDER — LIDOCAINE 50 MG/G
PATCH TOPICAL
COMMUNITY
Start: 2022-03-02

## 2023-02-17 RX ORDER — CEPHALEXIN 500 MG/1
CAPSULE ORAL
COMMUNITY
Start: 2023-01-31

## 2023-02-17 NOTE — PROGRESS NOTES
Johnnie Puckett presents today for   Chief Complaint   Patient presents with    Medicare AWV                   1. \"Have you been to the ER, urgent care clinic since your last visit? Yes 1- [facility] Hospitalized since your last visit? \" no    2. \"Have you seen or consulted any other health care providers outside of the [address] [place] [address] since your last visit? \" no     3. For patients aged 39-70: Has the patient had a colonoscopy / FIT/ Cologuard? NA - based on age      If the patient is female:    4. For patients aged 41-77: Has the patient had a mammogram within the past 2 years? NA - based on age or sex      11. For patients aged 21-65: Has the patient had a pap smear? NA - based on age or sexMedicare Annual Wellness Visit    Johnnie Puckett is here for Medicare AWV    Assessment & Plan   Medicare annual wellness visit, subsequent  Cancer [facility])  Type 2 diabetes mellitus with hyperglycemia, without long-term current use of insulin (HCC)  -     POCT glycosylated hemoglobin (Hb A1C)  -     SITagliptin (JANUVIA) 50 MG tablet; Take 1 tablet by mouth daily, Disp-90 tablet, R-1Normal  -     Microalbumin / Creatinine Urine Ratio; Future  Essential (primary) hypertension  -     lisinopril (PRINIVIL;ZESTRIL) 2.5 MG tablet; Take 1 tablet by mouth daily, Disp-90 tablet, R-2Normal  Acquired hypothyroidism  -     levothyroxine (SYNTHROID) 125 MCG tablet; Take 1 tablet by mouth daily, Disp-90 tablet, R-2Normal  Hyperlipidemia associated with type 2 diabetes mellitus ([facility] [address]  -     Lipid Panel; Future  -     Hepatic Function Panel; Future    Recommendations for Preventive Services Due: see orders and patient instructions/AVS.  Recommended screening schedule for the next 5-10 years is provided to the patient in written form: see Patient Instructions/AVS.     Return in 3 months (on 5/20/2023) for Medicare Annual Wellness Visit in 1 year.      Subjective   The following acute and/or chronic problems were also addressed today:      Mala Mcnamara is here for Medicare wellness/ACP visit, we also addressed her hypertension and diabetes. Her hearing is poor, and she says she has several hearing aids that people have given her, but has not had a formal audiologic exam with hearing aids recommended for her particular hearing issues. Patient's complete Health Risk Assessment and screening values have been reviewed and are found in Flowsheets. The following problems were reviewed today and where indicated follow up appointments were made and/or referrals ordered. Positive Risk Factor Screenings with Interventions:                  Dentist Screen:  Have you seen the dentist within the past year?: (!) No    Intervention:  needs to see her dentist , discussed potential loss of diabetic control if gingivitis or other dental infections undiagnosed    Hearing Screen:  Do you or your family notice any trouble with your hearing that hasn't been managed with hearing aids?: (!) Yes    Interventions:  Recommend she obtain formal hearing evaluation for good pair of hearing aids. Discussed hearing loss can be very isolating. Vision Screen:  Do you have difficulty driving, watching TV, or doing any of your daily activities because of your eyesight?: No  Have you had an eye exam within the past year?: (!) No  No results found. Interventions:    Discussed importance of at least yearly eye exam for diabetics. Safety:  Do you have either shower bars, grab bars, non-slip mats or non-slip surfaces in your shower or bathtub?: (!) No  Interventions:  recommend have safety bars/railings installed and shower/bath and other areas where a fall may occur                     Objective   Vitals:    02/20/23 1454   BP: 119/62   Pulse: 80   Resp: 18   Temp: 96.9 °F (36.1 °C)   TempSrc: Temporal   SpO2: 98%   Weight: 161 lb (73 kg)   Height: 5' 6\" (1.676 m)      Body mass index is 25.99 kg/m².       General Appearance: alert and oriented to person, place and time, well-developed and well-nourished, in no acute distress  Skin: warm and dry, no rash or erythema  Eyes: No icterus or pallor  Neck: neck supple and non tender without mass, no thyromegaly or thyroid nodules, no cervical lymphadenopathy   Pulmonary/Chest: clear to auscultation bilaterally- no wheezes, rales or rhonchi, normal air movement, no respiratory distress  Cardiovascular: normal rate, normal S1 and S2, no gallops, intact distal pulses, and no carotid bruits  Abdomen: soft, non-tender, non-distended, normal bowel sounds, no masses or organomegaly  Extremities: no cyanosis, no clubbing, and no edema       Allergies   Allergen Reactions    Pollen Extract Other (See Comments)    Hydromorphone Nausea And Vomiting     Prior to Visit Medications    Medication Sig Taking? Authorizing Provider   lisinopril (PRINIVIL;ZESTRIL) 2.5 MG tablet Take 1 tablet by mouth daily Yes Karissa Hernandez MD   SITagliptin (JANUVIA) 50 MG tablet Take 1 tablet by mouth daily Yes Karissa Hernandez MD   levothyroxine (SYNTHROID) 125 MCG tablet Take 1 tablet by mouth daily Yes Karissa Hernandez MD   Aspirin 162.5 MG CP24 Take 162 mg by mouth daily Yes Historical Provider, MD   albuterol sulfate HFA (PROVENTIL;VENTOLIN;PROAIR) 108 (90 Base) MCG/ACT inhaler Inhale into the lungs Yes Historical Provider, MD   cephALEXin (KEFLEX) 500 MG capsule TAKE 1 CAPSULE BY MOUTH EVERY 12 HOURS FOR 7 DAYS Yes Historical Provider, MD   clopidogrel (PLAVIX) 75 MG tablet Take 1 tablet by mouth daily Yes Historical Provider, MD   ergocalciferol (ERGOCALCIFEROL) 1.25 MG (09522 UT) capsule Take 50,000 Units by mouth every 7 days Yes Historical Provider, MD   fluticasone (FLOVENT HFA) 110 MCG/ACT inhaler Inhale 2 puffs into the lungs Yes Historical Provider, MD   lidocaine (LIDODERM) 5 % 1-3 patches to painful areas for 12 hours. Remove and discard after 12 hours. 12 hours later, may place new patch(es).  Yes Historical Provider, MD metFORMIN (GLUCOPHAGE-XR) 500 MG extended release tablet Take 4 tablets by mouth once daily Yes Historical Provider, MD   metoprolol succinate (TOPROL XL) 50 MG extended release tablet TAKE 1 TABLET BY MOUTH DAILY Yes Historical Provider, MD   simvastatin (ZOCOR) 20 MG tablet TAKE 1 TABLET BY MOUTH EVERY NIGHT Yes Historical Provider, MD   clopidogrel (PLAVIX) 75 MG tablet Take 1 tablet by mouth daily Yes Monie Burt MD       CareTeam (Including outside providers/suppliers regularly involved in providing care):   Patient Care Team:  Monie Burt MD as PCP - General  Monie Burt MD as PCP - Empaneled Provider  Isaias Tidwell MD as Referring Physician     Reviewed and updated this visit:  Tobacco  Allergies  Meds  Problems  Med Hx  Surg Hx  Soc Hx  Fam Hx               Monie Burt MD

## 2023-02-20 ENCOUNTER — OFFICE VISIT (OUTPATIENT)
Age: 83
End: 2023-02-20
Payer: MEDICARE

## 2023-02-20 VITALS
BODY MASS INDEX: 25.88 KG/M2 | HEIGHT: 66 IN | SYSTOLIC BLOOD PRESSURE: 119 MMHG | WEIGHT: 161 LBS | OXYGEN SATURATION: 98 % | TEMPERATURE: 96.9 F | DIASTOLIC BLOOD PRESSURE: 62 MMHG | HEART RATE: 80 BPM | RESPIRATION RATE: 18 BRPM

## 2023-02-20 DIAGNOSIS — C80.1 CANCER (HCC): ICD-10-CM

## 2023-02-20 DIAGNOSIS — Z71.89 ACP (ADVANCE CARE PLANNING): ICD-10-CM

## 2023-02-20 DIAGNOSIS — I10 ESSENTIAL (PRIMARY) HYPERTENSION: ICD-10-CM

## 2023-02-20 DIAGNOSIS — E03.9 ACQUIRED HYPOTHYROIDISM: ICD-10-CM

## 2023-02-20 DIAGNOSIS — E11.69 HYPERLIPIDEMIA ASSOCIATED WITH TYPE 2 DIABETES MELLITUS (HCC): ICD-10-CM

## 2023-02-20 DIAGNOSIS — E78.5 HYPERLIPIDEMIA ASSOCIATED WITH TYPE 2 DIABETES MELLITUS (HCC): ICD-10-CM

## 2023-02-20 DIAGNOSIS — E11.65 TYPE 2 DIABETES MELLITUS WITH HYPERGLYCEMIA, WITHOUT LONG-TERM CURRENT USE OF INSULIN (HCC): ICD-10-CM

## 2023-02-20 DIAGNOSIS — Z00.00 MEDICARE ANNUAL WELLNESS VISIT, SUBSEQUENT: Primary | ICD-10-CM

## 2023-02-20 LAB — HBA1C MFR BLD: 8.7 %

## 2023-02-20 PROCEDURE — 83036 HEMOGLOBIN GLYCOSYLATED A1C: CPT | Performed by: INTERNAL MEDICINE

## 2023-02-20 PROCEDURE — 99497 ADVNCD CARE PLAN 30 MIN: CPT | Performed by: INTERNAL MEDICINE

## 2023-02-20 PROCEDURE — 1090F PRES/ABSN URINE INCON ASSESS: CPT | Performed by: INTERNAL MEDICINE

## 2023-02-20 PROCEDURE — PBSHW POCT GLYCOSYLATED HEMOGLOBIN (HGB A1C): Performed by: INTERNAL MEDICINE

## 2023-02-20 PROCEDURE — 1123F ACP DISCUSS/DSCN MKR DOCD: CPT | Performed by: INTERNAL MEDICINE

## 2023-02-20 PROCEDURE — 3078F DIAST BP <80 MM HG: CPT | Performed by: INTERNAL MEDICINE

## 2023-02-20 PROCEDURE — 3052F HG A1C>EQUAL 8.0%<EQUAL 9.0%: CPT | Performed by: INTERNAL MEDICINE

## 2023-02-20 PROCEDURE — 3074F SYST BP LT 130 MM HG: CPT | Performed by: INTERNAL MEDICINE

## 2023-02-20 PROCEDURE — G8427 DOCREV CUR MEDS BY ELIG CLIN: HCPCS | Performed by: INTERNAL MEDICINE

## 2023-02-20 PROCEDURE — 99213 OFFICE O/P EST LOW 20 MIN: CPT | Performed by: INTERNAL MEDICINE

## 2023-02-20 PROCEDURE — 1036F TOBACCO NON-USER: CPT | Performed by: INTERNAL MEDICINE

## 2023-02-20 PROCEDURE — G8400 PT W/DXA NO RESULTS DOC: HCPCS | Performed by: INTERNAL MEDICINE

## 2023-02-20 PROCEDURE — G0439 PPPS, SUBSEQ VISIT: HCPCS | Performed by: INTERNAL MEDICINE

## 2023-02-20 PROCEDURE — G8484 FLU IMMUNIZE NO ADMIN: HCPCS | Performed by: INTERNAL MEDICINE

## 2023-02-20 PROCEDURE — G8417 CALC BMI ABV UP PARAM F/U: HCPCS | Performed by: INTERNAL MEDICINE

## 2023-02-20 RX ORDER — LEVOTHYROXINE SODIUM 0.12 MG/1
125 TABLET ORAL DAILY
Qty: 90 TABLET | Refills: 2 | Status: SHIPPED | OUTPATIENT
Start: 2023-02-20

## 2023-02-20 RX ORDER — LISINOPRIL 2.5 MG/1
TABLET ORAL
Qty: 90 TABLET | Refills: 2 | Status: SHIPPED | OUTPATIENT
Start: 2023-02-20

## 2023-02-20 ASSESSMENT — PATIENT HEALTH QUESTIONNAIRE - PHQ9
SUM OF ALL RESPONSES TO PHQ QUESTIONS 1-9: 0
SUM OF ALL RESPONSES TO PHQ QUESTIONS 1-9: 0
SUM OF ALL RESPONSES TO PHQ9 QUESTIONS 1 & 2: 0
SUM OF ALL RESPONSES TO PHQ QUESTIONS 1-9: 0
1. LITTLE INTEREST OR PLEASURE IN DOING THINGS: 0
SUM OF ALL RESPONSES TO PHQ QUESTIONS 1-9: 0
2. FEELING DOWN, DEPRESSED OR HOPELESS: 0

## 2023-02-20 ASSESSMENT — LIFESTYLE VARIABLES
HOW OFTEN DO YOU HAVE A DRINK CONTAINING ALCOHOL: NEVER
HOW MANY STANDARD DRINKS CONTAINING ALCOHOL DO YOU HAVE ON A TYPICAL DAY: PATIENT DOES NOT DRINK

## 2023-02-20 NOTE — PATIENT INSTRUCTIONS
Learning About Dental Care for Older Adults  Dental care for older adults: Overview  Dental care for older people is much the same as for younger adults. But older adults do have concerns that younger adults do not. Older adults may have problems with gum disease and decay on the roots of their teeth. They may need missing teeth replaced or broken fillings fixed. Or they may have dentures that need to be cared for. Some older adults may have trouble holding a toothbrush. You can help remind the person you are caring for to brush and floss their teeth or to clean their dentures. In some cases, you may need to do the brushing and other dental care tasks. People who have trouble using their hands or who have dementia may need this extra help. How can you help with dental care? Normal dental care  To keep the teeth and gums healthy:  · Brush the teeth with fluoride toothpaste twice a day--in the morning and at night--and floss at least once a day. Plaque can quickly build up on the teeth of older adults. · Watch for the signs of gum disease. These signs include gums that bleed after brushing or after eating hard foods, such as apples. · See a dentist regularly. Many experts recommend checkups every 6 months. · Keep the dentist up to date on any new medications the person is taking. · Encourage a balanced diet that includes whole grains, vegetables, and fruits, and that is low in saturated fat and sodium. · Encourage the person you're caring for not to use tobacco products. They can affect dental and general health. Many older adults have a fixed income and feel that they can't afford dental care. But most [facility] and [facility] have programs in which dentists help older adults by lowering fees. Contact your area's public health offices or  for information about dental care in your area.   Using a toothbrush  Older adults with arthritis sometimes have trouble brushing their teeth because they can't easily hold the toothbrush. Their hands and fingers may be stiff, painful, or weak. If this is the case, you can:  · Offer an electric toothbrush. · Enlarge the handle of a non-electric toothbrush by wrapping a sponge, an elastic bandage, or adhesive tape around it. · Push the toothbrush handle through a ball made of rubber or soft foam.  · Make the handle longer and thicker by taping Popsicle sticks or tongue depressors to it. You may also be able to buy special toothbrushes, toothpaste dispensers, and floss holders. Your doctor may recommend a soft-bristle toothbrush if the person you care for bleeds easily. Bleeding can happen because of a health problem or from certain medicines. A toothpaste for sensitive teeth may help if the person you care for has sensitive teeth. How do you brush and floss someone's teeth? If the person you are caring for has a hard time cleaning their teeth on their own, you may need to brush and floss their teeth for them. It may be easiest to have the person sit and face away from you, and to sit or stand behind them. That way you can steady their head against your arm as you reach around to floss and brush their teeth. Choose a place that has good lighting and is comfortable for both of you. Before you begin, gather your supplies. You will need gloves, floss, a toothbrush, and a container to hold water if you are not near a sink. Wash and dry your hands well and put on gloves. Start by flossing:  · Gently work a piece of floss between each of the teeth toward the gums. A plastic flossing tool may make this easier, and they are available at most [facility]. · Curve the floss around each tooth into a U-shape and gently slide it under the gum line. · Move the floss firmly up and down several times to scrape off the plaque. After you've finished flossing, throw away the used floss and begin brushing:  · Wet the brush and apply toothpaste.   · Place the brush at a 45-degree angle where the teeth meet the gums. Press firmly, and move the brush in small circles over the surface of the teeth. · Be careful not to brush too hard. Vigorous brushing can make the gums pull away from the teeth and can scratch the tooth enamel. · Brush all surfaces of the teeth, on the tongue side and on the cheek side. Pay special attention to the front teeth and all surfaces of the back teeth. · Brush chewing surfaces with short back-and-forth strokes. After you've finished, help the person rinse the remaining toothpaste from their mouth. Where can you learn more? Go to http://www.woods.com/ and enter F944 to learn more about \"Learning About Dental Care for Older Adults. \"  Current as of: June 16, 2022               Content Version: 13.5  © 9526-3693 Healthwise, Incorporated. Care instructions adapted under license by [facility] ([facility]). If you have questions about a medical condition or this instruction, always ask your healthcare professional. [place] [address] any warranty or liability for your use of this information. Learning About Vision Tests  What are vision tests? The four most common vision tests are visual acuity tests, refraction, visual field tests, and color vision tests. Visual acuity (sharpness) tests  These tests are used:  · To see if you need glasses or contact lenses. · To monitor an eye problem. · To check an eye injury. Visual acuity tests are done as part of routine exams. You may also have this test when you get your 's license or apply for some types of jobs. Visual field tests  These tests are used:  · To check for vision loss in any area of your range of vision. · To screen for certain eye diseases. · To look for nerve damage after a stroke, head injury, or other problem that could reduce blood flow to the brain.   Refraction and color tests  · A refraction test is done to find the right prescription for glasses and contact lenses. · A color vision test is done to check for color blindness. Color vision is often tested as part of a routine exam. You may also have this test when you apply for a job where recognizing different colors is important, such as , electronics, or the [employer]. How are vision tests done? Visual acuity test   · You cover one eye at a time. · You read aloud from a wall chart across the room. · You read aloud from a small card that you hold in your hand. Refraction   · You look into a special device. · The device puts lenses of different strengths in front of each eye to see how strong your glasses or contact lenses need to be. Visual field tests   · Your doctor may have you look through special machines. · Or your doctor may simply have you stare straight ahead while they move a finger into and out of your field of vision. Color vision test   · You look at pieces of printed test patterns in various colors. You say what number or symbol you see. · Your doctor may have you trace the number or symbol using a pointer. How do these tests feel? There is very little chance of having a problem from this test. If dilating drops are used for a vision test, they may make the eyes sting and cause a medicine taste in the mouth. Follow-up care is a key part of your treatment and safety. Be sure to make and go to all appointments, and call your doctor if you are having problems. It's also a good idea to know your test results and keep a list of the medicines you take. Where can you learn more? Go to http://www.beatty.com/ and enter G551 to learn more about \"Learning About Vision Tests. \"  Current as of: October 12, 2022               Content Version: 13.5  © 0318-1299 Healthwise, Incorporated. Care instructions adapted under license by [facility] ([facility]).  If you have questions about a medical condition or this instruction, always ask your healthcare professional. Eddie Mccoy disclaims any warranty or liability for your use of this information. Learning About Getting In and Out of a Bathtub Safely  Introduction  Many falls happen during bathing. All that water makes the bathroom a slippery place. · You may no longer be able to step over the tub wall comfortably and safely. · You might lean on things that aren't meant to support your weight, like a towel bar or the shower curtain. There are several types of aids that will help keep you safe. You can buy them at [employer] or home improvement stores or online. What tools can help you get in and out of a tub? Tub rail and grab bar  There are many types of bars and rails you can install on the walls next to your tub or on the edge of the tub. They will help keep you safe while you're getting in or out of the tub. It's important to have them permanently installed, rather than attached with suction. Transfer bench  There are several kinds of benches or seats to use in the bathtub. One type sits in the tub and extends out over the side. You sit on the bench. Lift one leg at a time into the tub, sliding your body over as you do so. Another common tub bench sits inside the tub. To use this type you need to be able to safely step into the tub before you sit down. Nonskid mats  Water makes both the floor of the tub and the bathroom floor slippery. You can buy adhesive strips that stick to the floor of the tub. Or you can use a nonskid tub mat. Outside the tub, make sure you use a rug with a nonskid bottom. Don't use a plain towel. Hand-held shower faucet  With a hand-held faucet, you can get clean without having to lower yourself into the tub. Hang a shower curtain to keep water from spilling out onto the floor. Follow-up care is a key part of your treatment and safety. Be sure to make and go to all appointments, and call your doctor if you are having problems.  It's also a good idea to know your test results and keep a list of the medicines you take. Current as of: March 9, 2022               Content Version: 13.5  © 2006-2022 [employer]. Care instructions adapted under license by [facility] ([facility]). If you have questions about a medical condition or this instruction, always ask your healthcare professional. [address] any warranty or liability for your use of this information. A Healthy Heart: Care Instructions  Your Care Instructions     Coronary artery disease, also called heart disease, occurs when a substance called plaque builds up in the vessels that supply oxygen-rich blood to your heart muscle. This can narrow the blood vessels and reduce blood flow. A heart attack happens when blood flow is completely blocked. A high-fat diet, smoking, and other factors increase the risk of heart disease. Your doctor has found that you have a chance of having heart disease. You can do lots of things to keep your heart healthy. It may not be easy, but you can change your diet, exercise more, and quit smoking. These steps really work to lower your chance of heart disease. Follow-up care is a key part of your treatment and safety. Be sure to make and go to all appointments, and call your doctor if you are having problems. It's also a good idea to know your test results and keep a list of the medicines you take. How can you care for yourself at home? Diet    · Use less salt when you cook and eat. This helps lower your blood pressure. Taste food before salting. Add only a little salt when you think you need it. With time, your taste buds will adjust to less salt.     · Eat fewer snack items, fast foods, canned soups, and other high-salt, high-fat, processed foods.     · Read food labels and try to avoid saturated and trans fats. They increase your risk of heart disease by raising cholesterol levels.     · Limit the amount of solid fat-butter, margarine, and shortening-you eat.  Use olive, peanut, or canola oil when you cook. Bake, broil, and steam foods instead of frying them.     · Eat a variety of fruit and vegetables every day. Dark green, deep orange, red, or yellow fruits and vegetables are especially good for you. Examples include spinach, carrots, peaches, and berries.     · Foods high in fiber can reduce your cholesterol and provide important vitamins and minerals. High-fiber foods include whole-grain cereals and breads, oatmeal, beans, brown rice, citrus fruits, and apples.     · Eat lean proteins. Heart-healthy proteins include seafood, lean meats and poultry, eggs, beans, peas, nuts, seeds, and soy products.     · Limit drinks and foods with added sugar. These include candy, desserts, and soda pop. Lifestyle changes    · If your doctor recommends it, get more exercise. Walking is a good choice. Bit by bit, increase the amount you walk every day. Try for at least 30 minutes on most days of the week. You also may want to swim, bike, or do other activities.     · Do not smoke. If you need help quitting, talk to your doctor about stop-smoking programs and medicines. These can increase your chances of quitting for good. Quitting smoking may be the most important step you can take to protect your heart. It is never too late to quit.     · Limit alcohol to 2 drinks a day for men and 1 drink a day for women. Too much alcohol can cause health problems.     · Manage other health problems such as diabetes, high blood pressure, and high cholesterol. If you think you may have a problem with alcohol or drug use, talk to your doctor. Medicines    · Take your medicines exactly as prescribed. Call your doctor if you think you are having a problem with your medicine.     · If your doctor recommends aspirin, take the amount directed each day. Make sure you take aspirin and not another kind of pain reliever, such as acetaminophen (Tylenol). When should you call for help? Call 911 if you have symptoms of a heart attack. These may include:    · Chest pain or pressure, or a strange feeling in the chest.     · Sweating.     · Shortness of breath.     · Pain, pressure, or a strange feeling in the back, neck, jaw, or upper belly or in one or both shoulders or arms.     · Lightheadedness or sudden weakness.     · A fast or irregular heartbeat. After you call 911, the  may tell you to chew 1 adult-strength or 2 to 4 low-dose aspirin. Wait for an ambulance. Do not try to drive yourself. Watch closely for changes in your health, and be sure to contact your doctor if you have any problems. Where can you learn more? Go to http://www.beatty.com/ and enter F075 to learn more about \"A Healthy Heart: Care Instructions. \"  Current as of: September 7, 2022               Content Version: 13.5  © 8747-6916 [employer]. Care instructions adapted under license by [facility] ([facility]). If you have questions about a medical condition or this instruction, always ask your healthcare professional. [place] [address] any warranty or liability for your use of this information. Personalized Preventive Plan for Antionette Friendipes - 2/20/2023  Medicare offers a range of preventive health benefits. Some of the tests and screenings are paid in full while other may be subject to a deductible, co-insurance, and/or copay. Some of these benefits include a comprehensive review of your medical history including lifestyle, illnesses that may run in your family, and various assessments and screenings as appropriate. After reviewing your medical record and screening and assessments performed today your provider may have ordered immunizations, labs, imaging, and/or referrals for you. A list of these orders (if applicable) as well as your Preventive Care list are included within your After Visit Summary for your review.     Other Preventive Recommendations:    A preventive eye exam performed by an eye specialist is recommended every 1-2 years to screen for glaucoma; cataracts, macular degeneration, and other eye disorders. A preventive dental visit is recommended every 6 months. Try to get at least 150 minutes of exercise per week or 10,000 steps per day on a pedometer . Order or download the FREE \"Exercise & Physical Activity: Your Everyday Guide\" from [employer]. Call 9-815.812.9253 or search [employer] online. You need 9815-5971 mg of calcium and 7395-4377 IU of vitamin D per day. It is possible to meet your calcium requirement with diet alone, but a vitamin D supplement is usually necessary to meet this goal.  When exposed to the sun, use a sunscreen that protects against both UVA and UVB radiation with an SPF of 30 or greater. Reapply every 2 to 3 hours or after sweating, drying off with a towel, or swimming. Always wear a seat belt when traveling in a car. Always wear a helmet when riding a bicycle or motorcycle. Learning About Dental Care for Older Adults  Dental care for older adults: Overview  Dental care for older people is much the same as for younger adults. But older adults do have concerns that younger adults do not. Older adults may have problems with gum disease and decay on the roots of their teeth. They may need missing teeth replaced or broken fillings fixed. Or they may have dentures that need to be cared for. Some older adults may have trouble holding a toothbrush. You can help remind the person you are caring for to brush and floss their teeth or to clean their dentures. In some cases, you may need to do the brushing and other dental care tasks. People who have trouble using their hands or who have dementia may need this extra help. How can you help with dental care? Normal dental care  To keep the teeth and gums healthy:  Brush the teeth with fluoride toothpaste twice a day--in the morning and at night--and floss at least once a day.  Plaque can quickly build up on the teeth of older adults. Watch for the signs of gum disease. These signs include gums that bleed after brushing or after eating hard foods, such as apples. See a dentist regularly. Many experts recommend checkups every 6 months. Keep the dentist up to date on any new medications the person is taking. Encourage a balanced diet that includes whole grains, vegetables, and fruits, and that is low in saturated fat and sodium. Encourage the person you're caring for not to use tobacco products. They can affect dental and general health. Many older adults have a fixed income and feel that they can't afford dental care. But most towns and cities have programs in which dentists help older adults by lowering fees. Contact your area's public health offices or  for information about dental care in your area. Using a toothbrush  Older adults with arthritis sometimes have trouble brushing their teeth because they can't easily hold the toothbrush. Their hands and fingers may be stiff, painful, or weak. If this is the case, you can: Offer an electric toothbrush. Enlarge the handle of a non-electric toothbrush by wrapping a sponge, an elastic bandage, or adhesive tape around it. Push the toothbrush handle through a ball made of rubber or soft foam.  Make the handle longer and thicker by taping Popsicle sticks or tongue depressors to it. You may also be able to buy special toothbrushes, toothpaste dispensers, and floss holders. Your doctor may recommend a soft-bristle toothbrush if the person you care for bleeds easily. Bleeding can happen because of a health problem or from certain medicines. A toothpaste for sensitive teeth may help if the person you care for has sensitive teeth. How do you brush and floss someone's teeth? If the person you are caring for has a hard time cleaning their teeth on their own, you may need to brush and floss their teeth for them.  It may be easiest to have the person sit and face away from you, and to sit or stand behind them. That way you can steady their head against your arm as you reach around to floss and brush their teeth. Choose a place that has good lighting and is comfortable for both of you. Before you begin, gather your supplies. You will need gloves, floss, a toothbrush, and a container to hold water if you are not near a sink. Wash and dry your hands well and put on gloves. Start by flossing:  Gently work a piece of floss between each of the teeth toward the gums. A plastic flossing tool may make this easier, and they are available at most [facility]. Curve the floss around each tooth into a U-shape and gently slide it under the gum line. Move the floss firmly up and down several times to scrape off the plaque. After you've finished flossing, throw away the used floss and begin brushing:  Wet the brush and apply toothpaste. Place the brush at a 45-degree angle where the teeth meet the gums. Press firmly, and move the brush in small circles over the surface of the teeth. Be careful not to brush too hard. Vigorous brushing can make the gums pull away from the teeth and can scratch the tooth enamel. Brush all surfaces of the teeth, on the tongue side and on the cheek side. Pay special attention to the front teeth and all surfaces of the back teeth. Brush chewing surfaces with short back-and-forth strokes. After you've finished, help the person rinse the remaining toothpaste from their mouth. Where can you learn more? Go to http://www.woods.com/ and enter F944 to learn more about \"Learning About Dental Care for Older Adults. \"  Current as of: June 16, 2022               Content Version: 13.5  © 2006-2022 Healthwise, Incorporated. Care instructions adapted under license by [facility] ([facility]).  If you have questions about a medical condition or this instruction, always ask your healthcare professional. [address] any warranty or liability for your use of this information. Learning About Vision Tests  What are vision tests? The four most common vision tests are visual acuity tests, refraction, visual field tests, and color vision tests. Visual acuity (sharpness) tests  These tests are used: To see if you need glasses or contact lenses. To monitor an eye problem. To check an eye injury. Visual acuity tests are done as part of routine exams. You may also have this test when you get your 's license or apply for some types of jobs. Visual field tests  These tests are used: To check for vision loss in any area of your range of vision. To screen for certain eye diseases. To look for nerve damage after a stroke, head injury, or other problem that could reduce blood flow to the brain. Refraction and color tests  A refraction test is done to find the right prescription for glasses and contact lenses. A color vision test is done to check for color blindness. Color vision is often tested as part of a routine exam. You may also have this test when you apply for a job where recognizing different colors is important, such as , electronics, or the [employer]. How are vision tests done? Visual acuity test   You cover one eye at a time. You read aloud from a wall chart across the room. You read aloud from a small card that you hold in your hand. Refraction   You look into a special device. The device puts lenses of different strengths in front of each eye to see how strong your glasses or contact lenses need to be. Visual field tests   Your doctor may have you look through special machines. Or your doctor may simply have you stare straight ahead while they move a finger into and out of your field of vision. Color vision test   You look at pieces of printed test patterns in various colors. You say what number or symbol you see.   Your doctor may have you trace the number or symbol using a pointer. How do these tests feel? There is very little chance of having a problem from this test. If dilating drops are used for a vision test, they may make the eyes sting and cause a medicine taste in the mouth. Follow-up care is a key part of your treatment and safety. Be sure to make and go to all appointments, and call your doctor if you are having problems. It's also a good idea to know your test results and keep a list of the medicines you take. Where can you learn more? Go to http://www.beatty.com/ and enter G551 to learn more about \"Learning About Vision Tests. \"  Current as of: October 12, 2022               Content Version: 13.5  © 2006-2022 [employer]. Care instructions adapted under license by [facility] ([facility]). If you have questions about a medical condition or this instruction, always ask your healthcare professional. [place] [address] any warranty or liability for your use of this information. Advance Directives: Care Instructions  Overview  An advance directive is a legal way to state your wishes at the end of your life. It tells your family and your doctor what to do if you can't say what you want. There are two main types of advance directives. You can change them any time your wishes change. Living will. This form tells your family and your doctor your wishes about life support and other treatment. The form is also called a declaration. Medical power of . This form lets you name a person to make treatment decisions for you when you can't speak for yourself. This person is called a health care agent (health care proxy, health care surrogate). The form is also called a durable power of  for health care. If you do not have an advance directive, decisions about your medical care may be made by a family member, or by a doctor or a  who doesn't know you.   It may help to think of an advance directive as a gift to the people who care for you. If you have one, they won't have to make tough decisions by themselves. For more information, including forms for your state, see the [address] website (www.caringinfo.org/planning/advance-directives/). Follow-up care is a key part of your treatment and safety. Be sure to make and go to all appointments, and call your doctor if you are having problems. It's also a good idea to know your test results and keep a list of the medicines you take. What should you include in an advance directive? Many states have a unique advance directive form. (It may ask you to address specific issues.) Or you might use a universal form that's approved by many states. If your form doesn't tell you what to address, it may be hard to know what to include in your advance directive. Use the questions below to help you get started. Who do you want to make decisions about your medical care if you are not able to? What life-support measures do you want if you have a serious illness that gets worse over time or can't be cured? What are you most afraid of that might happen? (Maybe you're afraid of having pain, losing your independence, or being kept alive by machines.)  Where would you prefer to die? (Your home? A hospital? A nursing home?)  Do you want to donate your organs when you die? Do you want certain Orthodoxy practices performed before you die? When should you call for help? Be sure to contact your doctor if you have any questions. Where can you learn more? Go to http://www.beatty.com/ and enter R264 to learn more about \"Advance Directives: Care Instructions. \"  Current as of: June 16, 2022               Content Version: 13.5  © 3019-9826 Healthwise, Incorporated. Care instructions adapted under license by [facility] ([facility]).  If you have questions about a medical condition or this instruction, always ask your healthcare professional. [place]  disclaims any warranty or liability for your use of this information. A Healthy Heart: Care Instructions  Your Care Instructions     Coronary artery disease, also called heart disease, occurs when a substance called plaque builds up in the vessels that supply oxygen-rich blood to your heart muscle. This can narrow the blood vessels and reduce blood flow. A heart attack happens when blood flow is completely blocked. A high-fat diet, smoking, and other factors increase the risk of heart disease. Your doctor has found that you have a chance of having heart disease. You can do lots of things to keep your heart healthy. It may not be easy, but you can change your diet, exercise more, and quit smoking. These steps really work to lower your chance of heart disease. Follow-up care is a key part of your treatment and safety. Be sure to make and go to all appointments, and call your doctor if you are having problems. It's also a good idea to know your test results and keep a list of the medicines you take. How can you care for yourself at home? Diet    Use less salt when you cook and eat. This helps lower your blood pressure. Taste food before salting. Add only a little salt when you think you need it. With time, your taste buds will adjust to less salt.     Eat fewer snack items, fast foods, canned soups, and other high-salt, high-fat, processed foods.     Read food labels and try to avoid saturated and trans fats. They increase your risk of heart disease by raising cholesterol levels.     Limit the amount of solid fat-butter, margarine, and shortening-you eat. Use olive, peanut, or canola oil when you cook. Bake, broil, and steam foods instead of frying them.     Eat a variety of fruit and vegetables every day. Dark green, deep orange, red, or yellow fruits and vegetables are especially good for you.  Examples include spinach, carrots, peaches, and berries.     Foods high in fiber can reduce your cholesterol and provide important vitamins and minerals. High-fiber foods include whole-grain cereals and breads, oatmeal, beans, brown rice, citrus fruits, and apples.     Eat lean proteins. Heart-healthy proteins include seafood, lean meats and poultry, eggs, beans, peas, nuts, seeds, and soy products.     Limit drinks and foods with added sugar. These include candy, desserts, and soda pop. Lifestyle changes    If your doctor recommends it, get more exercise. Walking is a good choice. Bit by bit, increase the amount you walk every day. Try for at least 30 minutes on most days of the week. You also may want to swim, bike, or do other activities.     Do not smoke. If you need help quitting, talk to your doctor about stop-smoking programs and medicines. These can increase your chances of quitting for good. Quitting smoking may be the most important step you can take to protect your heart. It is never too late to quit.     Limit alcohol to 2 drinks a day for men and 1 drink a day for women. Too much alcohol can cause health problems.     Manage other health problems such as diabetes, high blood pressure, and high cholesterol. If you think you may have a problem with alcohol or drug use, talk to your doctor. Medicines    Take your medicines exactly as prescribed. Call your doctor if you think you are having a problem with your medicine.     If your doctor recommends aspirin, take the amount directed each day. Make sure you take aspirin and not another kind of pain reliever, such as acetaminophen (Tylenol). When should you call for help? Call 911 if you have symptoms of a heart attack. These may include:    Chest pain or pressure, or a strange feeling in the chest.     Sweating.     Shortness of breath.     Pain, pressure, or a strange feeling in the back, neck, jaw, or upper belly or in one or both shoulders or arms.     Lightheadedness or sudden weakness.     A fast or irregular heartbeat.    After you call 911, the  may tell you to chew 1 adult-strength or 2 to 4 low-dose aspirin. Wait for an ambulance. Do not try to drive yourself. Watch closely for changes in your health, and be sure to contact your doctor if you have any problems. Where can you learn more? Go to http://www.beatty.com/ and enter F075 to learn more about \"A Healthy Heart: Care Instructions. \"  Current as of: September 7, 2022               Content Version: 13.5  © 2006-2022 Healthwise, [employer]. Care instructions adapted under license by [facility] ([facility]). If you have questions about a medical condition or this instruction, always ask your healthcare professional. [place] [address] any warranty or liability for your use of this information. Personalized Preventive Plan for Qasim Given - 2/20/2023  Medicare offers a range of preventive health benefits. Some of the tests and screenings are paid in full while other may be subject to a deductible, co-insurance, and/or copay. Some of these benefits include a comprehensive review of your medical history including lifestyle, illnesses that may run in your family, and various assessments and screenings as appropriate. After reviewing your medical record and screening and assessments performed today your provider may have ordered immunizations, labs, imaging, and/or referrals for you. A list of these orders (if applicable) as well as your Preventive Care list are included within your After Visit Summary for your review. Other Preventive Recommendations:    A preventive eye exam performed by an eye specialist is recommended every 1-2 years to screen for glaucoma; cataracts, macular degeneration, and other eye disorders. A preventive dental visit is recommended every 6 months. Try to get at least 150 minutes of exercise per week or 10,000 steps per day on a pedometer .   Order or download the FREE \"Exercise & Physical Activity: Your Everyday Guide\" from [employer]. Call 3-430.420.8001 or search The [employer] online. You need 2656-8432 mg of calcium and 4882-7935 IU of vitamin D per day. It is possible to meet your calcium requirement with diet alone, but a vitamin D supplement is usually necessary to meet this goal.  When exposed to the sun, use a sunscreen that protects against both UVA and UVB radiation with an SPF of 30 or greater. Reapply every 2 to 3 hours or after sweating, drying off with a towel, or swimming. Always wear a seat belt when traveling in a car. Always wear a helmet when riding a bicycle or motorcycle.

## 2023-02-21 NOTE — ACP (ADVANCE CARE PLANNING)
Advance Care Planning     Advance Care Planning (ACP) Physician/NP/PA Conversation    Date of Conversation: 2/20/2023  Conducted with: Patient with Decision Making Capacity    Healthcare Decision Maker:      Primary Decision Maker: Huma 9 - [phone]    Secondary Decision Maker: Kyler Cervantes - Child - [phone]    Click here to complete Healthcare Decision Makers including selection of the Healthcare Decision Maker Relationship (ie \"Primary\")  Today we documented Decision Maker(s) consistent with Legal Next of Kin hierarchy. Care Preferences:    Hospitalization: \"If your health worsens and it becomes clear that your chance of recovery is unlikely, what would be your preference regarding hospitalization? \"  The patient is unsure. Ventilation: \"If you were unable to breath on your own and your chance of recovery was unlikely, what would be your preference about the use of a ventilator (breathing machine) if it was available to you? \"  The patient is unsure. Resuscitation: \"In the event your heart stopped as a result of an underlying serious health condition, would you want attempts made to restart your heart, or would you prefer a natural death? \"  The patient is unsure.     artificial nutrition    Conversation Outcomes / Follow-Up Plan:  ACP in process - information provided, considering goals and options  Reviewed DNR/DNI and patient elects Full Code (Attempt Resuscitation)    Length of Voluntary ACP Conversation in minutes:  16 minutes    Lisandro Marte MD

## 2023-02-22 ENCOUNTER — TELEPHONE (OUTPATIENT)
Age: 83
End: 2023-02-22

## 2023-02-22 DIAGNOSIS — E11.65 TYPE 2 DIABETES MELLITUS WITH HYPERGLYCEMIA, WITHOUT LONG-TERM CURRENT USE OF INSULIN (HCC): Primary | ICD-10-CM

## 2023-02-22 NOTE — TELEPHONE ENCOUNTER
Please reschedule her lab appointment 5/22/23, her insurance will not allow her hemoglobin A1c to be done before 5/20/2022.   Note that all the other labs I ordered should be done on that date also

## 2023-05-19 ENCOUNTER — HOSPITAL ENCOUNTER (OUTPATIENT)
Facility: HOSPITAL | Age: 83
Setting detail: SPECIMEN
End: 2023-05-19
Payer: MEDICARE

## 2023-05-19 DIAGNOSIS — E11.65 TYPE 2 DIABETES MELLITUS WITH HYPERGLYCEMIA, WITHOUT LONG-TERM CURRENT USE OF INSULIN (HCC): ICD-10-CM

## 2023-05-19 DIAGNOSIS — E11.69 HYPERLIPIDEMIA ASSOCIATED WITH TYPE 2 DIABETES MELLITUS (HCC): ICD-10-CM

## 2023-05-19 DIAGNOSIS — E78.5 HYPERLIPIDEMIA ASSOCIATED WITH TYPE 2 DIABETES MELLITUS (HCC): ICD-10-CM

## 2023-05-19 LAB
ALBUMIN SERPL-MCNC: 3.7 G/DL (ref 3.4–5)
ALBUMIN/GLOB SERPL: 1.2 (ref 0.8–1.7)
ALP SERPL-CCNC: 91 U/L (ref 45–117)
ALT SERPL-CCNC: 15 U/L (ref 13–56)
AST SERPL-CCNC: 11 U/L (ref 10–38)
BILIRUB DIRECT SERPL-MCNC: 0.2 MG/DL (ref 0–0.2)
BILIRUB SERPL-MCNC: 0.7 MG/DL (ref 0.2–1)
CHOLEST SERPL-MCNC: 130 MG/DL
CREAT UR-MCNC: 100 MG/DL (ref 30–125)
EST. AVERAGE GLUCOSE BLD GHB EST-MCNC: 192 MG/DL
GLOBULIN SER CALC-MCNC: 3.2 G/DL (ref 2–4)
HBA1C MFR BLD: 8.3 % (ref 4.2–5.6)
HDLC SERPL-MCNC: 41 MG/DL (ref 40–60)
HDLC SERPL: 3.2 (ref 0–5)
LDLC SERPL CALC-MCNC: 59 MG/DL (ref 0–100)
LIPID PANEL: ABNORMAL
MICROALBUMIN UR-MCNC: 10.2 MG/DL (ref 0–3)
MICROALBUMIN/CREAT UR-RTO: 102 MG/G (ref 0–30)
PROT SERPL-MCNC: 6.9 G/DL (ref 6.4–8.2)
TRIGL SERPL-MCNC: 150 MG/DL
VLDLC SERPL CALC-MCNC: 30 MG/DL

## 2023-05-19 PROCEDURE — 83036 HEMOGLOBIN GLYCOSYLATED A1C: CPT

## 2023-05-19 PROCEDURE — 82043 UR ALBUMIN QUANTITATIVE: CPT

## 2023-05-19 PROCEDURE — 36415 COLL VENOUS BLD VENIPUNCTURE: CPT

## 2023-05-19 PROCEDURE — 80061 LIPID PANEL: CPT

## 2023-05-19 PROCEDURE — 80076 HEPATIC FUNCTION PANEL: CPT

## 2023-05-19 PROCEDURE — 82570 ASSAY OF URINE CREATININE: CPT

## 2023-05-26 ENCOUNTER — OFFICE VISIT (OUTPATIENT)
Age: 83
End: 2023-05-26
Payer: MEDICARE

## 2023-05-26 VITALS
RESPIRATION RATE: 18 BRPM | OXYGEN SATURATION: 97 % | SYSTOLIC BLOOD PRESSURE: 127 MMHG | DIASTOLIC BLOOD PRESSURE: 83 MMHG | HEART RATE: 59 BPM | HEIGHT: 66 IN | WEIGHT: 159 LBS | TEMPERATURE: 97 F | BODY MASS INDEX: 25.55 KG/M2

## 2023-05-26 DIAGNOSIS — E11.65 TYPE 2 DIABETES MELLITUS WITH HYPERGLYCEMIA, WITHOUT LONG-TERM CURRENT USE OF INSULIN (HCC): Primary | ICD-10-CM

## 2023-05-26 DIAGNOSIS — M25.552 BILATERAL HIP PAIN: ICD-10-CM

## 2023-05-26 DIAGNOSIS — I10 ESSENTIAL (PRIMARY) HYPERTENSION: ICD-10-CM

## 2023-05-26 DIAGNOSIS — J45.20 MILD INTERMITTENT ASTHMA, UNCOMPLICATED: ICD-10-CM

## 2023-05-26 DIAGNOSIS — M25.551 BILATERAL HIP PAIN: ICD-10-CM

## 2023-05-26 DIAGNOSIS — E55.9 VITAMIN D DEFICIENCY, UNSPECIFIED: ICD-10-CM

## 2023-05-26 PROCEDURE — G8427 DOCREV CUR MEDS BY ELIG CLIN: HCPCS | Performed by: INTERNAL MEDICINE

## 2023-05-26 PROCEDURE — 99214 OFFICE O/P EST MOD 30 MIN: CPT | Performed by: INTERNAL MEDICINE

## 2023-05-26 PROCEDURE — 3074F SYST BP LT 130 MM HG: CPT | Performed by: INTERNAL MEDICINE

## 2023-05-26 PROCEDURE — 1123F ACP DISCUSS/DSCN MKR DOCD: CPT | Performed by: INTERNAL MEDICINE

## 2023-05-26 PROCEDURE — 1036F TOBACCO NON-USER: CPT | Performed by: INTERNAL MEDICINE

## 2023-05-26 PROCEDURE — 3078F DIAST BP <80 MM HG: CPT | Performed by: INTERNAL MEDICINE

## 2023-05-26 PROCEDURE — G8400 PT W/DXA NO RESULTS DOC: HCPCS | Performed by: INTERNAL MEDICINE

## 2023-05-26 PROCEDURE — 1090F PRES/ABSN URINE INCON ASSESS: CPT | Performed by: INTERNAL MEDICINE

## 2023-05-26 PROCEDURE — 3052F HG A1C>EQUAL 8.0%<EQUAL 9.0%: CPT | Performed by: INTERNAL MEDICINE

## 2023-05-26 PROCEDURE — G8417 CALC BMI ABV UP PARAM F/U: HCPCS | Performed by: INTERNAL MEDICINE

## 2023-05-26 RX ORDER — ERGOCALCIFEROL 1.25 MG/1
50000 CAPSULE ORAL
Qty: 12 CAPSULE | Refills: 3 | Status: SHIPPED | OUTPATIENT
Start: 2023-05-26

## 2023-05-26 SDOH — ECONOMIC STABILITY: HOUSING INSECURITY
IN THE LAST 12 MONTHS, WAS THERE A TIME WHEN YOU DID NOT HAVE A STEADY PLACE TO SLEEP OR SLEPT IN A SHELTER (INCLUDING NOW)?: NO

## 2023-05-26 SDOH — ECONOMIC STABILITY: FOOD INSECURITY: WITHIN THE PAST 12 MONTHS, YOU WORRIED THAT YOUR FOOD WOULD RUN OUT BEFORE YOU GOT MONEY TO BUY MORE.: NEVER TRUE

## 2023-05-26 SDOH — ECONOMIC STABILITY: FOOD INSECURITY: WITHIN THE PAST 12 MONTHS, THE FOOD YOU BOUGHT JUST DIDN'T LAST AND YOU DIDN'T HAVE MONEY TO GET MORE.: NEVER TRUE

## 2023-05-26 SDOH — ECONOMIC STABILITY: INCOME INSECURITY: HOW HARD IS IT FOR YOU TO PAY FOR THE VERY BASICS LIKE FOOD, HOUSING, MEDICAL CARE, AND HEATING?: NOT HARD AT ALL

## 2023-05-26 ASSESSMENT — PATIENT HEALTH QUESTIONNAIRE - PHQ9
2. FEELING DOWN, DEPRESSED OR HOPELESS: 0
SUM OF ALL RESPONSES TO PHQ QUESTIONS 1-9: 0
SUM OF ALL RESPONSES TO PHQ9 QUESTIONS 1 & 2: 0
SUM OF ALL RESPONSES TO PHQ QUESTIONS 1-9: 0
1. LITTLE INTEREST OR PLEASURE IN DOING THINGS: 0

## 2023-05-26 ASSESSMENT — ENCOUNTER SYMPTOMS
SHORTNESS OF BREATH: 0
BLOOD IN STOOL: 0

## 2023-05-26 NOTE — PROGRESS NOTES
Ivan Boyle presents today for   Chief Complaint   Patient presents with    Medicare AWV                 1. \"Have you been to the ER, urgent care clinic since your last visit? Hospitalized since your last visit? \" no    2. \"Have you seen or consulted any other health care providers outside of the [address] [place] [address] since your last visit? \" no     3. For patients aged 39-70: Has the patient had a colonoscopy / FIT/ Cologuard? NA - based on age      If the patient is female:    4. For patients aged 41-77: Has the patient had a mammogram within the past 2 years? NA - based on age or sex      11. For patients aged 21-65: Has the patient had a pap smear?  NA - based on age or sex
Sufficiently Active    Days of Exercise per Week: 7 days    Minutes of Exercise per Session: 30 min   Stress: Not on file   Social Connections: Not on file   Intimate Partner Violence: Not on file   Housing Stability: Unknown    Unable to Pay for Housing in the Last Year: Not on file    Number of [place] in the Last Year: Not on file    Unstable Housing in the Last Year: No        Family History   Problem Relation Age of Onset    High Cholesterol Son     Breast Cancer Sister     Heart Attack Father         at 64    Stroke Mother     Thyroid Disease Son         hypothyroidism           /83 (Site: Left Upper Arm, Position: Sitting, Cuff Size: Large Adult)   Pulse 59   Temp 97 °F (36.1 °C) (Temporal)   Resp 18   Ht 5' 6\" (1.676 m)   Wt 159 lb (72.1 kg)   SpO2 97%   BMI 25.66 kg/m²      Review of Systems   Respiratory:  Negative for shortness of breath. Cardiovascular:  Negative for chest pain. Gastrointestinal:  Negative for blood in stool. Genitourinary:  Negative for hematuria. Musculoskeletal:  Positive for arthralgias. Neurological:  Negative for numbness. Psychiatric/Behavioral:  Negative for dysphoric mood. The patient is not nervous/anxious. Physical Exam  Constitutional:       General: She is not in acute distress. Eyes:      General: No scleral icterus. Conjunctiva/sclera: Conjunctivae normal.   Neck:      Comments: No cervical or supraclavicular LAD  Cardiovascular:      Rate and Rhythm: Normal rate and regular rhythm. Pulses: Normal pulses. Heart sounds: Murmur heard. No friction rub. No gallop. Pulmonary:      Effort: Pulmonary effort is normal.      Breath sounds: Normal breath sounds. Abdominal:      Palpations: Abdomen is soft. Tenderness: There is no abdominal tenderness. Musculoskeletal:      Comments: No clubbing, cyanosis, or edema. Calves nontender, no cords. Skin:     General: Skin is warm and dry.    Neurological:      General:

## 2023-06-16 RX ORDER — METOPROLOL SUCCINATE 50 MG/1
50 TABLET, EXTENDED RELEASE ORAL DAILY
Qty: 90 TABLET | Refills: 3 | Status: SHIPPED | OUTPATIENT
Start: 2023-06-16

## 2023-06-19 ENCOUNTER — TELEPHONE (OUTPATIENT)
Age: 83
End: 2023-06-19

## 2023-06-19 DIAGNOSIS — N30.00 ACUTE CYSTITIS WITHOUT HEMATURIA: Primary | ICD-10-CM

## 2023-06-19 RX ORDER — CEPHALEXIN 500 MG/1
500 CAPSULE ORAL 3 TIMES DAILY
Qty: 15 CAPSULE | Refills: 0 | Status: SHIPPED | OUTPATIENT
Start: 2023-06-19 | End: 2023-06-24

## 2023-06-19 NOTE — PROGRESS NOTES
Let her know I sent antibiotic to her pharmacy-- if symptoms not better by Wednesday, call to set up a time to check urine and culture

## 2023-06-20 NOTE — TELEPHONE ENCOUNTER
Patient contacted, patient identified with two identifiers (Name & ). Patient aware of results per Dr. Damon Rather and verbalizes understanding.

## 2023-06-20 NOTE — TELEPHONE ENCOUNTER
Please see response from DR. Crews in encounter dated same day.       Per Dr. Willam Turcios:    Let her know I sent antibiotic to her pharmacy-- if symptoms not better by Wednesday, call to set up a time to check urine and culture

## 2023-07-24 ENCOUNTER — TELEPHONE (OUTPATIENT)
Age: 83
End: 2023-07-24

## 2023-07-24 DIAGNOSIS — E78.5 HYPERLIPIDEMIA ASSOCIATED WITH TYPE 2 DIABETES MELLITUS (HCC): Primary | ICD-10-CM

## 2023-07-24 DIAGNOSIS — E11.69 HYPERLIPIDEMIA ASSOCIATED WITH TYPE 2 DIABETES MELLITUS (HCC): Primary | ICD-10-CM

## 2023-07-24 RX ORDER — SIMVASTATIN 20 MG
TABLET ORAL
Qty: 90 TABLET | Refills: 3 | Status: SHIPPED | OUTPATIENT
Start: 2023-07-24

## 2023-07-24 NOTE — TELEPHONE ENCOUNTER
Refill request received via fax:    - simvastatin (ZOCOR) 20 MG tablet    Pharmacy: [id number] on [address]

## 2023-08-14 ENCOUNTER — TELEPHONE (OUTPATIENT)
Age: 83
End: 2023-08-14

## 2023-08-14 DIAGNOSIS — I25.811 CORONARY ARTERY DISEASE INVOLVING NATIVE ARTERY OF TRANSPLANTED HEART WITHOUT ANGINA PECTORIS: ICD-10-CM

## 2023-08-14 RX ORDER — CLOPIDOGREL BISULFATE 75 MG/1
75 TABLET ORAL DAILY
Qty: 90 TABLET | Refills: 3 | Status: SHIPPED | OUTPATIENT
Start: 2023-08-14

## 2023-08-14 NOTE — TELEPHONE ENCOUNTER
Please send refill to [facility]    clopidogrel (PLAVIX) 75 MG tablet 90 tablet 1 2/15/2023     Sig - Route:  Take 1 tablet by mouth daily - Oral

## 2023-08-25 ENCOUNTER — OFFICE VISIT (OUTPATIENT)
Age: 83
End: 2023-08-25
Payer: MEDICARE

## 2023-08-25 VITALS
BODY MASS INDEX: 25.88 KG/M2 | TEMPERATURE: 97 F | DIASTOLIC BLOOD PRESSURE: 69 MMHG | SYSTOLIC BLOOD PRESSURE: 134 MMHG | WEIGHT: 161 LBS | RESPIRATION RATE: 18 BRPM | OXYGEN SATURATION: 95 % | HEART RATE: 57 BPM | HEIGHT: 66 IN

## 2023-08-25 DIAGNOSIS — I10 ESSENTIAL (PRIMARY) HYPERTENSION: ICD-10-CM

## 2023-08-25 DIAGNOSIS — J45.20 MILD INTERMITTENT ASTHMA, UNCOMPLICATED: ICD-10-CM

## 2023-08-25 DIAGNOSIS — R10.32 LEFT INGUINAL PAIN: ICD-10-CM

## 2023-08-25 DIAGNOSIS — E11.65 TYPE 2 DIABETES MELLITUS WITH HYPERGLYCEMIA, WITHOUT LONG-TERM CURRENT USE OF INSULIN (HCC): Primary | ICD-10-CM

## 2023-08-25 LAB — HBA1C MFR BLD: 8.2 %

## 2023-08-25 PROCEDURE — 3052F HG A1C>EQUAL 8.0%<EQUAL 9.0%: CPT | Performed by: INTERNAL MEDICINE

## 2023-08-25 PROCEDURE — 3074F SYST BP LT 130 MM HG: CPT | Performed by: INTERNAL MEDICINE

## 2023-08-25 PROCEDURE — 3078F DIAST BP <80 MM HG: CPT | Performed by: INTERNAL MEDICINE

## 2023-08-25 PROCEDURE — 99214 OFFICE O/P EST MOD 30 MIN: CPT | Performed by: INTERNAL MEDICINE

## 2023-08-25 PROCEDURE — 99211 OFF/OP EST MAY X REQ PHY/QHP: CPT | Performed by: INTERNAL MEDICINE

## 2023-08-25 PROCEDURE — 83036 HEMOGLOBIN GLYCOSYLATED A1C: CPT | Performed by: INTERNAL MEDICINE

## 2023-08-25 PROCEDURE — G8400 PT W/DXA NO RESULTS DOC: HCPCS | Performed by: INTERNAL MEDICINE

## 2023-08-25 PROCEDURE — 1036F TOBACCO NON-USER: CPT | Performed by: INTERNAL MEDICINE

## 2023-08-25 PROCEDURE — G8427 DOCREV CUR MEDS BY ELIG CLIN: HCPCS | Performed by: INTERNAL MEDICINE

## 2023-08-25 PROCEDURE — 1123F ACP DISCUSS/DSCN MKR DOCD: CPT | Performed by: INTERNAL MEDICINE

## 2023-08-25 PROCEDURE — PBSHW AMB POC HEMOGLOBIN A1C: Performed by: INTERNAL MEDICINE

## 2023-08-25 PROCEDURE — 1090F PRES/ABSN URINE INCON ASSESS: CPT | Performed by: INTERNAL MEDICINE

## 2023-08-25 PROCEDURE — G8417 CALC BMI ABV UP PARAM F/U: HCPCS | Performed by: INTERNAL MEDICINE

## 2023-08-25 ASSESSMENT — PATIENT HEALTH QUESTIONNAIRE - PHQ9
SUM OF ALL RESPONSES TO PHQ QUESTIONS 1-9: 0
1. LITTLE INTEREST OR PLEASURE IN DOING THINGS: 0
SUM OF ALL RESPONSES TO PHQ9 QUESTIONS 1 & 2: 0
2. FEELING DOWN, DEPRESSED OR HOPELESS: 0

## 2023-08-25 NOTE — PROGRESS NOTES
Cherylene Phy presents today for   Chief Complaint   Patient presents with    Diabetes                 1. \"Have you been to the ER, urgent care clinic since your last visit? Hospitalized since your last visit? \" no    2. \"Have you seen or consulted any other health care providers outside of the [address] [place] [address] since your last visit? \" no     3. For patients aged 43-73: Has the patient had a colonoscopy / FIT/ Cologuard? NA - based on age      If the patient is female:    4. For patients aged 43-66: Has the patient had a mammogram within the past 2 years? NA - based on age or sex      11. For patients aged 21-65: Has the patient had a pap smear?  NA - based on age or sex

## 2023-08-26 ASSESSMENT — ENCOUNTER SYMPTOMS
SHORTNESS OF BREATH: 0
BLOOD IN STOOL: 0

## 2023-08-26 NOTE — PROGRESS NOTES
Diabetes  Pertinent negatives for diabetes include no chest pain. Caden Rodrigues is here for follow-up of her type 2 diabetes. She read the package insert of the Januvia, which she had filled, but did not start taking it because she was concerned about mixing it with metformin. She was also not sure if she should add the Januvia, since she still had metformin left. Explained that I intended to have her take both medications, they are safe in combination and work well in combination. She says she has read that metformin is a \"bad\" drug, advised that when used in the appropriate population with adequate lab monitoring, it is safe to take. Since she was last here, her son had a myocardial infarction, but is doing fine. She has really not had to use her rescue inhaler for her asthma since I last saw her. She complains of left inguinal pain, usually when ambulating. It is sharp. 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   Dr. Lindsey Mayo  3/10/2016         CORONARY ARTERY ANGIOGRAM  3/10/2016         CORONARY ARTERY BYPASS GRAFT      ERCP  4/29/15    [facility]     LV ANGIOGRAPHY  3/10/2016         ORTHOPEDIC SURGERY      R hip & femur     OTHER SURGICAL HISTORY      endometrial biopsy-- normal     TONSILLECTOMY          Current Outpatient Medications   Medication Sig Dispense Refill    clopidogrel (PLAVIX) 75 MG tablet Take 1 tablet by mouth daily 90 tablet 3    simvastatin (ZOCOR) 20 MG

## 2023-09-14 ENCOUNTER — HOSPITAL ENCOUNTER (OUTPATIENT)
Facility: HOSPITAL | Age: 83
Discharge: HOME | End: 2023-09-14
Payer: MEDICARE

## 2023-09-14 DIAGNOSIS — R10.32 LEFT INGUINAL PAIN: ICD-10-CM

## 2023-09-14 PROCEDURE — 73502 X-RAY EXAM HIP UNI 2-3 VIEWS: CPT

## 2023-09-15 ENCOUNTER — TELEPHONE (OUTPATIENT)
Age: 83
End: 2023-09-15

## 2023-10-01 NOTE — TELEPHONE ENCOUNTER
Discharge Diagnosis  Acute respiratory failure due to aspiration pneumonia, suicidal ideation, major depressive disorder, schizophrenia, also has rumors dementia, GERD, hypertension    Issues Requiring Follow-Up  Glycemia, at discharge patient has a pending hemoglobin A1c  Due to the persistent hyperglycemia metformin was initiated during hospitalization    Discharge Meds     Your medication list        START taking these medications        Instructions Last Dose Given Next Dose Due   metFORMIN 500 mg tablet  Commonly known as: Glucophage      Take 1 tablet (500 mg) by mouth 2 times a day with meals.              CHANGE how you take these medications        Instructions Last Dose Given Next Dose Due   QUEtiapine 200 mg tablet  Commonly known as: SEROquel  What changed: Another medication with the same name was added. Make sure you understand how and when to take each.           QUEtiapine 50 mg tablet  Commonly known as: SEROquel  What changed: Another medication with the same name was added. Make sure you understand how and when to take each.           QUEtiapine 200 mg tablet  Commonly known as: SEROquel  What changed: You were already taking a medication with the same name, and this prescription was added. Make sure you understand how and when to take each.      Take 1 tablet (200 mg) by mouth 2 times a day.              CONTINUE taking these medications        Instructions Last Dose Given Next Dose Due   aspirin 81 mg chewable tablet           benztropine 1 mg tablet  Commonly known as: Cogentin           buPROPion  mg 24 hr tablet  Commonly known as: Wellbutrin XL           gabapentin 600 mg tablet  Commonly known as: Neurontin           multivitamin tablet           pantoprazole 40 mg EC tablet  Commonly known as: ProtoNix           Perseris 90 mg suspension,extended rel syring subcutaneous injection  Generic drug: risperiDONE           ranolazine 500 mg 12 hr tablet  Commonly known as: Ranexa          Patient called and states that she has been having a lot of pain in her lower back and hip area, and its very uncomfortable to urinate. States she has UTI's often and thinks she might have one, she is requesting if she can be tested. No appts available, please advise. Patient can be reached at [phone].          STOP taking these medications      amLODIPine 10 mg tablet  Commonly known as: Norvasc        mirtazapine 30 mg tablet  Commonly known as: Remeron        naltrexone 50 mg tablet  Commonly known as: Depade        sertraline 100 mg tablet  Commonly known as: Zoloft        sodium chloride 1,000 mg tablet                  Where to Get Your Medications        You can get these medications from any pharmacy    Bring a paper prescription for each of these medications  metFORMIN 500 mg tablet  QUEtiapine 200 mg tablet         Test Results Pending At Discharge  Pending Labs       Order Current Status    Hemoglobin A1C In process            Hospital Course   ELIER CAMPOS is a 65 year old Male with a past medical history of Alzheimer's dementia, hypertension, GERD, depression with suicidal ideations, major depression, schizophrenia, history of polysubstance use disorder and recurrent tinea pedis who was admitted to the hospital for aspiration pneumonia and suicidal ideation.   During his hospitalization he was initially treated with vancomycin and Zosyn in outside facility but this was transitioned to cefepime after an aspiration event.  During his hospitalization at [facility] he completed a 7-day course of cefepime.  Flu, COVID were negative MRSA screen showed MSSA.  Blood cultures showed no growth to date.  Speech therapy recommended a soft bite-size diet with lthin liquids.  Reported depression and suicidal ideations however he was evaluated by psychiatry who deemed him as a minimal risk advised the patient was stable to return to a skilled nursing facility.    Pertinent Physical Exam At Time of Discharge  Physical Exam  Constitutional: Well developed, awake/alert/oriented x3, no distress, alert and cooperative   Eyes: EOMI grossly, clear sclera   ENMT: mucous membranes moist   Head/Neck: Neck supple   Respiratory/Thorax: Patent airways, diminished in the right lung base   Cardiovascular: Regular, rate and rhythm,  no murmurs, 2+ equal pulses of the extremities, normal S 1and S 2   Gastrointestinal: Nondistended, soft, non-tender, no rebound tenderness or guarding,  +BS, no bruits   Musculoskeletal: ROM intact   Extremities: no cyanosis, no edema   Neurological: alert and oriented x3, intact senses, moves all limbs against resistance   Psychological: Appropriate mood and behavior   Skin: Warm and dry, intact        Outpatient Follow-Up  Follow-up with PCP at nursing facility      Monster Portillo DO

## 2023-11-21 ENCOUNTER — TELEPHONE (OUTPATIENT)
Age: 83
End: 2023-11-21

## 2023-11-21 DIAGNOSIS — I10 ESSENTIAL (PRIMARY) HYPERTENSION: ICD-10-CM

## 2023-11-21 DIAGNOSIS — I25.811 CORONARY ARTERY DISEASE INVOLVING NATIVE ARTERY OF TRANSPLANTED HEART WITHOUT ANGINA PECTORIS: ICD-10-CM

## 2023-11-21 RX ORDER — CLOPIDOGREL BISULFATE 75 MG/1
75 TABLET ORAL DAILY
Qty: 90 TABLET | Refills: 1 | Status: SHIPPED | OUTPATIENT
Start: 2023-11-21

## 2023-11-21 RX ORDER — LISINOPRIL 2.5 MG/1
TABLET ORAL
Qty: 90 TABLET | Refills: 2 | Status: SHIPPED | OUTPATIENT
Start: 2023-11-21

## 2023-11-21 NOTE — TELEPHONE ENCOUNTER
----- Message from Clayton Mahoney sent at 11/21/2023 10:40 AM EST -----  Subject: Refill Request    QUESTIONS  Name of Medication? clopidogrel (PLAVIX) 75 MG tablet  Patient-reported dosage and instructions? once a day   How many days do you have left? 4  Preferred Pharmacy? [address] [id number]  Pharmacy phone number (if available)? [phone]  ---------------------------------------------------------------------------  --------------,  Name of Medication? lisinopril (PRINIVIL;ZESTRIL) 2.5 MG tablet  Patient-reported dosage and instructions? once a day   How many days do you have left? 4  Preferred Pharmacy? [facility] #[id number]  Pharmacy phone number (if available)? [phone]  ---------------------------------------------------------------------------  --------------  CALL BACK INFO  What is the best way for the office to contact you? OK to leave message on   voicemail  Preferred Call Back Phone Number? [phone]  ---------------------------------------------------------------------------  --------------  SCRIPT ANSWERS  Relationship to Patient?  Self

## 2023-12-14 ENCOUNTER — TELEPHONE (OUTPATIENT)
Age: 83
End: 2023-12-14

## 2023-12-14 DIAGNOSIS — E03.9 ACQUIRED HYPOTHYROIDISM: ICD-10-CM

## 2023-12-14 RX ORDER — LEVOTHYROXINE SODIUM 0.12 MG/1
125 TABLET ORAL DAILY
Qty: 90 TABLET | Refills: 0 | Status: SHIPPED | OUTPATIENT
Start: 2023-12-14

## 2023-12-14 NOTE — PROGRESS NOTES
Let her know I refilled her thyroid medication, please make appointment to see me in January to check her diabetes, she missed an appointment with me last month

## 2023-12-14 NOTE — TELEPHONE ENCOUNTER
Refill request via fax    levothyroxine (SYNTHROID) 125 MCG tablet     Pharmacy [id number] [facility]

## 2023-12-28 ENCOUNTER — TELEPHONE (OUTPATIENT)
Age: 83
End: 2023-12-28

## 2023-12-28 NOTE — TELEPHONE ENCOUNTER
----- Message from Sharon Lang sent at 12/27/2023  1:24 PM EST -----  Subject: Message to Provider    QUESTIONS  Information for Provider? Please contact patient is an earlier appointment   becomes available.   ---------------------------------------------------------------------------  --------------  CALL BACK INFO  [phone]; OK to leave message on voicemail  ---------------------------------------------------------------------------  --------------  SCRIPT ANSWERS  Relationship to Patient? Self

## 2024-01-02 ENCOUNTER — TELEPHONE (OUTPATIENT)
Age: 84
End: 2024-01-02

## 2024-01-02 ENCOUNTER — OFFICE VISIT (OUTPATIENT)
Age: 84
End: 2024-01-02
Payer: MEDICARE

## 2024-01-02 ENCOUNTER — HOSPITAL ENCOUNTER (OUTPATIENT)
Facility: HOSPITAL | Age: 84
Setting detail: SPECIMEN
Discharge: HOME | End: 2024-01-05
Payer: MEDICARE

## 2024-01-02 VITALS
OXYGEN SATURATION: 97 % | TEMPERATURE: 96 F | HEIGHT: 66 IN | SYSTOLIC BLOOD PRESSURE: 142 MMHG | WEIGHT: 162 LBS | HEART RATE: 65 BPM | BODY MASS INDEX: 26.03 KG/M2 | RESPIRATION RATE: 18 BRPM | DIASTOLIC BLOOD PRESSURE: 74 MMHG

## 2024-01-02 DIAGNOSIS — E11.65 TYPE 2 DIABETES MELLITUS WITH HYPERGLYCEMIA, WITHOUT LONG-TERM CURRENT USE OF INSULIN (HCC): Primary | ICD-10-CM

## 2024-01-02 DIAGNOSIS — I10 ESSENTIAL HYPERTENSION: ICD-10-CM

## 2024-01-02 DIAGNOSIS — R30.0 DYSURIA: ICD-10-CM

## 2024-01-02 DIAGNOSIS — Z23 ENCOUNTER FOR IMMUNIZATION: ICD-10-CM

## 2024-01-02 DIAGNOSIS — M25.552 LEFT HIP PAIN: ICD-10-CM

## 2024-01-02 DIAGNOSIS — J45.20 MILD INTERMITTENT ASTHMA WITHOUT COMPLICATION: ICD-10-CM

## 2024-01-02 LAB
BILIRUBIN, URINE, POC: NEGATIVE
BLOOD URINE, POC: NEGATIVE
GLUCOSE URINE, POC: NORMAL
HBA1C MFR BLD: 8.9 %
KETONES, URINE, POC: NEGATIVE
LEUKOCYTE ESTERASE, URINE, POC: NEGATIVE
NITRITE, URINE, POC: NEGATIVE
PH, URINE, POC: 6 (ref 4.6–8)
PROTEIN,URINE, POC: NEGATIVE
SPECIFIC GRAVITY, URINE, POC: 1.03 (ref 1–1.03)
URINALYSIS CLARITY, POC: CLEAR
URINALYSIS COLOR, POC: YELLOW
UROBILINOGEN, POC: NORMAL

## 2024-01-02 PROCEDURE — 1123F ACP DISCUSS/DSCN MKR DOCD: CPT | Performed by: INTERNAL MEDICINE

## 2024-01-02 PROCEDURE — 99214 OFFICE O/P EST MOD 30 MIN: CPT | Performed by: INTERNAL MEDICINE

## 2024-01-02 PROCEDURE — 1036F TOBACCO NON-USER: CPT | Performed by: INTERNAL MEDICINE

## 2024-01-02 PROCEDURE — G8427 DOCREV CUR MEDS BY ELIG CLIN: HCPCS | Performed by: INTERNAL MEDICINE

## 2024-01-02 PROCEDURE — 81003 URINALYSIS AUTO W/O SCOPE: CPT | Performed by: INTERNAL MEDICINE

## 2024-01-02 PROCEDURE — 83036 HEMOGLOBIN GLYCOSYLATED A1C: CPT | Performed by: INTERNAL MEDICINE

## 2024-01-02 PROCEDURE — G8400 PT W/DXA NO RESULTS DOC: HCPCS | Performed by: INTERNAL MEDICINE

## 2024-01-02 PROCEDURE — G8417 CALC BMI ABV UP PARAM F/U: HCPCS | Performed by: INTERNAL MEDICINE

## 2024-01-02 PROCEDURE — G8484 FLU IMMUNIZE NO ADMIN: HCPCS | Performed by: INTERNAL MEDICINE

## 2024-01-02 PROCEDURE — 1090F PRES/ABSN URINE INCON ASSESS: CPT | Performed by: INTERNAL MEDICINE

## 2024-01-02 PROCEDURE — 87086 URINE CULTURE/COLONY COUNT: CPT

## 2024-01-02 PROCEDURE — 87186 SC STD MICRODIL/AGAR DIL: CPT

## 2024-01-02 PROCEDURE — PBSHW PNEUMOCOCCAL, PPSV23, PNEUMOVAX 23, (AGE 2 YRS+), SC/IM: Performed by: INTERNAL MEDICINE

## 2024-01-02 PROCEDURE — 87077 CULTURE AEROBIC IDENTIFY: CPT

## 2024-01-02 PROCEDURE — 3078F DIAST BP <80 MM HG: CPT | Performed by: INTERNAL MEDICINE

## 2024-01-02 PROCEDURE — 3077F SYST BP >= 140 MM HG: CPT | Performed by: INTERNAL MEDICINE

## 2024-01-02 PROCEDURE — PBSHW AMB POC HEMOGLOBIN A1C: Performed by: INTERNAL MEDICINE

## 2024-01-02 PROCEDURE — G0009 ADMIN PNEUMOCOCCAL VACCINE: HCPCS | Performed by: INTERNAL MEDICINE

## 2024-01-02 PROCEDURE — PBSHW AMB POC URINALYSIS DIP STICK AUTO W/O MICRO: Performed by: INTERNAL MEDICINE

## 2024-01-02 ASSESSMENT — ENCOUNTER SYMPTOMS
BLOOD IN STOOL: 0
SHORTNESS OF BREATH: 0

## 2024-01-02 NOTE — PROGRESS NOTES
Peggy Jensen presents today for   Chief Complaint   Patient presents with    Diabetes     A1c                 1. \"Have you been to the ER, urgent care clinic since your last visit?  Hospitalized since your last visit?\" no    2. \"Have you seen or consulted any other health care providers outside of the [facility] since your last visit?\" no     3. For patients aged 45-75: Has the patient had a colonoscopy / FIT/ Cologuard? NA - based on age      If the patient is female:    4. For patients aged 40-74: Has the patient had a mammogram within the past 2 years? NA - based on age or sex      5. For patients aged 21-65: Has the patient had a pap smear? NA - based on age or sex

## 2024-01-03 NOTE — TELEPHONE ENCOUNTER
Let her know that I put in a referral for her to see Dr. Monterroso for orthopedics, the orthopedist who saw her for her hip surgery are no longer practicing in the area.  Please give her his phone number so she can call if she does not hear from them in a week.

## 2024-01-03 NOTE — PROGRESS NOTES
Diabetes  Pertinent negatives for hypoglycemia include no nervousness/anxiousness. Pertinent negatives for diabetes include no chest pain.   Peggy Jensen is here with her  to follow-up on her diabetes.  She never started taking the Januvia after reading the potential adverse effects in the package insert.  She has trouble taking the 4 large metformin tablets, wonders if she can have less 1000 mg.  Discussed that this will have to be taken 1 twice daily, and they voiced understanding.  Was not aware she had asthma, states she is not needing any of her inhalers at this time.  She complains of L>R hip pain, I will find out in her chart who did her hip surgery and send her back to him for further evaluation.  Complains of vague urinary symptoms    She and her  need forms filled out documenting that they have diabetes.  We requested the forms today and we will fill them out and fax them back when we get them.        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  Dr. Murphy    CARDIAC CATH PROCEDURE  3/10/2016         CORONARY ARTERY ANGIOGRAM  3/10/2016         CORONARY ARTERY BYPASS GRAFT      [facility]CP  4/29/15    [facility]     LV ANGIOGRAPHY  3/10/2016         ORTHOPEDIC SURGERY      R hip & femur     OTHER SURGICAL HISTORY      endometrial biopsy-- normal     TONSILLECTOMY          Current Outpatient Medications   Medication Sig Dispense Refill    metFORMIN (GLUCOPHAGE) 1000 MG tablet Take 1 tablet by mouth 2

## 2024-01-05 ENCOUNTER — TELEPHONE (OUTPATIENT)
Age: 84
End: 2024-01-05

## 2024-01-05 DIAGNOSIS — N39.0 ACUTE UTI: Primary | ICD-10-CM

## 2024-01-05 LAB
BACTERIA SPEC CULT: ABNORMAL
CC UR VC: ABNORMAL
SERVICE CMNT-IMP: ABNORMAL

## 2024-01-05 RX ORDER — CIPROFLOXACIN 250 MG/1
250 TABLET, FILM COATED ORAL 2 TIMES DAILY
Qty: 10 TABLET | Refills: 0 | Status: SHIPPED | OUTPATIENT
Start: 2024-01-05 | End: 2024-01-10

## 2024-01-19 ENCOUNTER — TELEPHONE (OUTPATIENT)
Age: 84
End: 2024-01-19

## 2024-01-19 DIAGNOSIS — N39.0 ACUTE UTI: Primary | ICD-10-CM

## 2024-01-19 RX ORDER — CIPROFLOXACIN 250 MG/1
250 TABLET, FILM COATED ORAL 2 TIMES DAILY
Qty: 10 TABLET | Refills: 0 | Status: SHIPPED | OUTPATIENT
Start: 2024-01-19 | End: 2024-01-24

## 2024-01-19 NOTE — TELEPHONE ENCOUNTER
Patient called in and stated she came into the office on 1/2/24 due to an UTI and was prescribed a medicine to help her get rid of it however, I do not see this prescribed medicine in her chart. I do see where she took the urine sample but there is no charted medication for the final result of the test results. The patient is trying to have the said medication refilled because she is still having burning and uncomfortableness down their. Please advise    CB: [phone]

## 2024-02-03 ENCOUNTER — TELEPHONE (OUTPATIENT)
Age: 84
End: 2024-02-03

## 2024-02-03 DIAGNOSIS — U07.1 COVID-19 VIRUS INFECTION: Primary | ICD-10-CM

## 2024-02-03 NOTE — TELEPHONE ENCOUNTER
On-call message:    2/3/2024 11:45 AM     Yefri called stating patient tested positive for COVID about 30 minutes ago, symptoms started yesterday, with chills, sneezing, nasal congestion, cough, diarrhea, and emesis x 1.  No fever, chest pain, dyspnea or loss of taste or smell.    Patient candidate for Paxlovid due to age 82, underlying hypertension, coronary artery disease, and type 2 diabetes.    I am aware of interaction between Paxlovid and Plavix, will have patient take an extra one half Plavix daily for the 5 days that she is on the medication.

## 2024-02-06 ENCOUNTER — TELEPHONE (OUTPATIENT)
Age: 84
End: 2024-02-06

## 2024-02-06 NOTE — TELEPHONE ENCOUNTER
Pt called after re-injuring her right hip. She fell down 3 hours ago. She is planning to get checked out at [facility]'s ED.    Dr. Mulu Pike  [facility] [address], [address]  [place] [address]  Phone: [phone]  Fax: [phone]

## 2024-02-21 DIAGNOSIS — J45.20 MILD INTERMITTENT ASTHMA, UNCOMPLICATED: Primary | ICD-10-CM

## 2024-02-21 DIAGNOSIS — I25.811 CORONARY ARTERY DISEASE INVOLVING NATIVE ARTERY OF TRANSPLANTED HEART WITHOUT ANGINA PECTORIS: ICD-10-CM

## 2024-02-21 DIAGNOSIS — E11.69 HYPERLIPIDEMIA ASSOCIATED WITH TYPE 2 DIABETES MELLITUS (HCC): ICD-10-CM

## 2024-02-21 DIAGNOSIS — E55.9 VITAMIN D DEFICIENCY, UNSPECIFIED: ICD-10-CM

## 2024-02-21 DIAGNOSIS — E78.5 HYPERLIPIDEMIA ASSOCIATED WITH TYPE 2 DIABETES MELLITUS (HCC): ICD-10-CM

## 2024-02-21 DIAGNOSIS — I10 ESSENTIAL (PRIMARY) HYPERTENSION: ICD-10-CM

## 2024-02-21 DIAGNOSIS — E03.9 ACQUIRED HYPOTHYROIDISM: ICD-10-CM

## 2024-02-21 RX ORDER — LEVOTHYROXINE SODIUM 0.12 MG/1
125 TABLET ORAL DAILY
Qty: 90 TABLET | Refills: 3 | Status: SHIPPED | OUTPATIENT
Start: 2024-02-21

## 2024-02-21 RX ORDER — ERGOCALCIFEROL 1.25 MG/1
50000 CAPSULE ORAL
Qty: 12 CAPSULE | Refills: 3 | Status: SHIPPED | OUTPATIENT
Start: 2024-02-21

## 2024-02-21 RX ORDER — SIMVASTATIN 20 MG
TABLET ORAL
Qty: 90 TABLET | Refills: 3 | Status: SHIPPED | OUTPATIENT
Start: 2024-02-21

## 2024-02-21 RX ORDER — ALBUTEROL SULFATE 90 UG/1
AEROSOL, METERED RESPIRATORY (INHALATION)
Qty: 18 G | Refills: 5 | Status: SHIPPED | OUTPATIENT
Start: 2024-02-21

## 2024-02-21 RX ORDER — FLUTICASONE PROPIONATE 110 UG/1
AEROSOL, METERED RESPIRATORY (INHALATION)
Qty: 3 EACH | Refills: 2 | Status: SHIPPED | OUTPATIENT
Start: 2024-02-21

## 2024-02-21 RX ORDER — CLOPIDOGREL BISULFATE 75 MG/1
75 TABLET ORAL DAILY
Qty: 90 TABLET | Refills: 2 | Status: SHIPPED | OUTPATIENT
Start: 2024-02-21

## 2024-02-21 RX ORDER — METOPROLOL SUCCINATE 50 MG/1
50 TABLET, EXTENDED RELEASE ORAL DAILY
Qty: 90 TABLET | Refills: 3 | Status: SHIPPED | OUTPATIENT
Start: 2024-02-21

## 2024-02-21 RX ORDER — LISINOPRIL 2.5 MG/1
TABLET ORAL
Qty: 90 TABLET | Refills: 2 | Status: SHIPPED | OUTPATIENT
Start: 2024-02-21

## 2024-03-01 ENCOUNTER — TELEPHONE (OUTPATIENT)
Age: 84
End: 2024-03-01

## 2024-03-01 DIAGNOSIS — I10 ESSENTIAL (PRIMARY) HYPERTENSION: ICD-10-CM

## 2024-03-01 DIAGNOSIS — I25.811 CORONARY ARTERY DISEASE INVOLVING NATIVE ARTERY OF TRANSPLANTED HEART WITHOUT ANGINA PECTORIS: ICD-10-CM

## 2024-03-01 DIAGNOSIS — E55.9 VITAMIN D DEFICIENCY, UNSPECIFIED: ICD-10-CM

## 2024-03-01 DIAGNOSIS — E03.9 ACQUIRED HYPOTHYROIDISM: ICD-10-CM

## 2024-03-01 DIAGNOSIS — J45.20 MILD INTERMITTENT ASTHMA, UNCOMPLICATED: ICD-10-CM

## 2024-03-01 NOTE — TELEPHONE ENCOUNTER
Patient's  will call when a refill is needed.      states to please sent medication to [facility].

## 2024-03-01 NOTE — TELEPHONE ENCOUNTER
----- Message from Toni Aleman sent at 3/1/2024  8:44 AM EST -----  Subject: Refill Request    QUESTIONS  Name of Medication? simvastatin (ZOCOR) 20 MG tablet  Patient-reported dosage and instructions? Pharmacy not sure  How many days do you have left? 0  Preferred Pharmacy? [facility]  Pharmacy phone number (if available)? [phone]  Additional Information for Provider? Patient needs a 90 day supply.   Pharmacy couldn't give me directions.   ---------------------------------------------------------------------------  --------------,  Name of Medication? metoprolol succinate (TOPROL XL) 50 MG extended   release tablet  Patient-reported dosage and instructions? ?  How many days do you have left? 0  Preferred Pharmacy? [facility]  Pharmacy phone number (if available)? [phone]  Additional Information for Provider? Patient needs a 90 day supply.   Pharmacy couldn't give me directions.   ---------------------------------------------------------------------------  --------------,  Name of Medication? Other - Vitamin D 89064 units  Patient-reported dosage and instructions? ?  How many days do you have left? 0  Preferred Pharmacy? [facility]  Pharmacy phone number (if available)? [phone]  Additional Information for Provider? Patient needs a 90 day supply.   Pharmacy couldn't give me directions.   ---------------------------------------------------------------------------  --------------,  Name of Medication? fluticasone (FLOVENT HFA) 110 MCG/ACT inhaler  Patient-reported dosage and instructions? ?  How many days do you have left? 0  Preferred Pharmacy? [facility]  Pharmacy phone number (if available)? [phone]  Additional Information for Provider? Patient needs a 90 day supply.   Pharmacy couldn't give me directions.   ---------------------------------------------------------------------------  --------------,  Name of Medication?

## 2024-04-02 PROBLEM — M54.50 LEFT LOW BACK PAIN: Status: ACTIVE | Noted: 2024-04-02

## 2024-04-23 ENCOUNTER — TELEPHONE (OUTPATIENT)
Age: 84
End: 2024-04-23

## 2024-04-23 DIAGNOSIS — M79.651 ACUTE PAIN OF RIGHT THIGH: Primary | ICD-10-CM

## 2024-04-23 NOTE — TELEPHONE ENCOUNTER
Patient call in stating that she is having right femur pain for 3 days now, to the point she can not walk without help. She would like to know if she can have a referral to have an xray. Please Advise

## 2024-04-24 ENCOUNTER — TELEPHONE (OUTPATIENT)
Age: 84
End: 2024-04-24

## 2024-04-24 NOTE — TELEPHONE ENCOUNTER
----- Message from Devorah Gee sent at 4/24/2024  4:11 PM EDT -----  Subject: Appointment Request    Reason for Call: Established Patient Appointment needed: Routine ED Follow   Up Visit    QUESTIONS    Reason for appointment request? No appointments available during search     Additional Information for Provider? pt. is in need of ED FU from   4/25/2024 for right leg pain/blood clot   ---------------------------------------------------------------------------  --------------  CALL BACK INFO  [phone]; OK to leave message on voicemail  ---------------------------------------------------------------------------  --------------  SCRIPT ANSWERS

## 2024-05-16 ENCOUNTER — TELEPHONE (OUTPATIENT)
Age: 84
End: 2024-05-16

## 2024-05-16 NOTE — TELEPHONE ENCOUNTER
Pt called stating she was seen vinnie alvarado 04/24 for DVT  She was prescribed Xarelto  and advised to f/up with pcp  Pt IS ON DAY 22 OF XARELTO   Discharge Instructions  - documented in this encounter  Discharge Instructions  Gretchen Khan PA - 04/24/2024 11:11 AM EDT   Formatting of this note might be different from the original.  Schedule an appointment with your primary care practitioner for 2 to 3 weeks from today for repeat PVL for your DVT    Contact the prescriber that prescribes Plavix (clopidogrel) and inform them that you had been started on Xarelto for a DVT    Return to emergency department if symptoms worsen, you develop chest pain or shortness of breath     Please advise

## 2024-05-17 ENCOUNTER — TELEPHONE (OUTPATIENT)
Age: 84
End: 2024-05-17

## 2024-05-17 NOTE — TELEPHONE ENCOUNTER
Mrs. Jensen called in and states she received a starter pack XARELTO® (rivaroxaban)  for the blood clot found while at ER in which she needs a refill for (1wk left). Pack states must consult with Dr before day 22 (which was 5/16/24)    Please advise.  CB#:[phone]

## 2024-05-17 NOTE — TELEPHONE ENCOUNTER
Xarelto 20mg daily ordered per message. She needs labs to check her renal function ect. This was discussed with the pt.     Dr. Mulu Pike  [facility] [address], [facility] [address]  [place] [address]  Phone: [phone]  Fax: [phone]

## 2024-05-20 ENCOUNTER — TELEPHONE (OUTPATIENT)
Age: 84
End: 2024-05-20

## 2024-05-20 NOTE — TELEPHONE ENCOUNTER
----- Message from Sofy Whitten sent at 5/20/2024 11:45 AM EDT -----  Subject: Refill Request    QUESTIONS  Name of Medication? rivaroxaban (XARELTO) 20 MG TABS tablet  Patient-reported dosage and instructions? take once a day  How many days do you have left? 6  Preferred Pharmacy? [id number]  Pharmacy phone number (if available)? [phone]  ---------------------------------------------------------------------------  --------------  CALL BACK INFO  What is the best way for the office to contact you? OK to leave message on   voicemail  Preferred Call Back Phone Number? [phone]  ---------------------------------------------------------------------------  --------------  SCRIPT ANSWERS  Relationship to Patient? Self

## 2024-05-20 NOTE — TELEPHONE ENCOUNTER
----- Message from Sofy Whitten sent at 5/20/2024 11:46 AM EDT -----  Subject: Message to Provider    QUESTIONS  Information for Provider? patient would like Penny to call her back about   prescription on Rivaroxaban (XARELTO) 20 MG TABS tablet   ---------------------------------------------------------------------------  --------------  CALL BACK INFO  [phone]; OK to leave message on voicemail  ---------------------------------------------------------------------------  --------------  SCRIPT ANSWERS  Relationship to Patient? Self

## 2024-05-21 ENCOUNTER — TELEPHONE (OUTPATIENT)
Age: 84
End: 2024-05-21

## 2024-05-21 NOTE — TELEPHONE ENCOUNTER
Patient's  called and stated that her right ankle was swollen, patient was told to go to ER because it is the same leg (DVT) was present; patient is currently taking xarelto. Patient  denies SOB.

## 2024-05-23 ENCOUNTER — TELEPHONE (OUTPATIENT)
Age: 84
End: 2024-05-23

## 2024-05-23 NOTE — TELEPHONE ENCOUNTER
I can see her at 9:20 tomorrow morning  
If she needs to come in because of leg pain or swelling, she needs to go to the emergency room so they can do another ultrasound if needed.  I cannot check that here  
Patient called in stating that she was d.c from hospital on 5/21/24, she was in for blood clot and swelling in feet, she would like to come I for an appt today, she states she can not wait until 5/28/24. She would like for the nurse to give her a call back. Please Advise  
Patient is aware of message and was in the ER on 5/21/2024 and states her medications were changed and she will need to follow up with Dr. Crews in 2 days.     Patient states she was trying to schedule a ER follow up, patient states her foot is still swollen.  Patient was advised to elevate foot and will check with Dr. Crews to see how soon she needs to be seen for her ER follow up.       
Patient needs to come in because of leg pain or swelling, she needs to go through the emergency room so they can do another ultrasound if necessary; I cant do that here  
Pt contacted and scheduled   
yes

## 2024-05-24 ENCOUNTER — OFFICE VISIT (OUTPATIENT)
Age: 84
End: 2024-05-24
Payer: MEDICARE

## 2024-05-24 VITALS
TEMPERATURE: 97 F | WEIGHT: 154 LBS | RESPIRATION RATE: 18 BRPM | HEART RATE: 69 BPM | BODY MASS INDEX: 24.75 KG/M2 | SYSTOLIC BLOOD PRESSURE: 140 MMHG | HEIGHT: 66 IN | DIASTOLIC BLOOD PRESSURE: 71 MMHG | OXYGEN SATURATION: 94 %

## 2024-05-24 DIAGNOSIS — Z86.718 ENCOUNTER FOR FOLLOW-UP OF ACUTE DEEP VEIN THROMBOSIS (DVT) OF RIGHT LOWER EXTREMITY: Primary | ICD-10-CM

## 2024-05-24 DIAGNOSIS — Z09 ENCOUNTER FOR FOLLOW-UP OF ACUTE DEEP VEIN THROMBOSIS (DVT) OF RIGHT LOWER EXTREMITY: Primary | ICD-10-CM

## 2024-05-24 DIAGNOSIS — L03.115 CELLULITIS OF RIGHT LOWER EXTREMITY: ICD-10-CM

## 2024-05-24 DIAGNOSIS — M54.50 LEFT LOW BACK PAIN, UNSPECIFIED CHRONICITY, UNSPECIFIED WHETHER SCIATICA PRESENT: ICD-10-CM

## 2024-05-24 PROCEDURE — G2211 COMPLEX E/M VISIT ADD ON: HCPCS | Performed by: INTERNAL MEDICINE

## 2024-05-24 PROCEDURE — 3078F DIAST BP <80 MM HG: CPT | Performed by: INTERNAL MEDICINE

## 2024-05-24 PROCEDURE — 1123F ACP DISCUSS/DSCN MKR DOCD: CPT | Performed by: INTERNAL MEDICINE

## 2024-05-24 PROCEDURE — 3077F SYST BP >= 140 MM HG: CPT | Performed by: INTERNAL MEDICINE

## 2024-05-24 PROCEDURE — 99214 OFFICE O/P EST MOD 30 MIN: CPT | Performed by: INTERNAL MEDICINE

## 2024-05-24 RX ORDER — CEPHALEXIN 500 MG/1
500 CAPSULE ORAL EVERY 12 HOURS
COMMUNITY
Start: 2024-05-21 | End: 2024-05-28

## 2024-05-24 ASSESSMENT — ENCOUNTER SYMPTOMS
SHORTNESS OF BREATH: 0
BACK PAIN: 1
BLOOD IN STOOL: 0

## 2024-05-24 ASSESSMENT — PATIENT HEALTH QUESTIONNAIRE - PHQ9
2. FEELING DOWN, DEPRESSED OR HOPELESS: NOT AT ALL
1. LITTLE INTEREST OR PLEASURE IN DOING THINGS: NOT AT ALL
SUM OF ALL RESPONSES TO PHQ QUESTIONS 1-9: 0
SUM OF ALL RESPONSES TO PHQ9 QUESTIONS 1 & 2: 0
SUM OF ALL RESPONSES TO PHQ QUESTIONS 1-9: 0

## 2024-05-24 NOTE — PROGRESS NOTES
Peggy Jensen presents today for ED follow up.              1. \"Have you been to the ER, urgent care clinic since your last visit? Yes 4-, 4- and 5- [facility] Hospitalized since your last visit?\" no    2. \"Have you seen or consulted any other health care providers outside of the [facility] since your last visit?\" no     3. For patients aged 45-75: Has the patient had a colonoscopy / FIT/ Cologuard? NA - based on age      If the patient is female:    4. For patients aged 40-74: Has the patient had a mammogram within the past 2 years? NA - based on age or sex      5. For patients aged 21-65: Has the patient had a pap smear? NA - based on age or sex

## 2024-05-24 NOTE — PROGRESS NOTES
HPI     Peggy Jensen is here with her  after being seen in the ER twice since I last saw her.  She was diagnosed with a DVT several weeks ago and placed on Xarelto.  She returned to the emergency room 3 days ago with increased right lower extremity erythema and edema, and was diagnosed with cellulitis and placed on Keflex.  Her repeat ultrasound showed some new clot, but resolution of previously seen clot.    Denies any recent prolonged travel or prolonged bedrest.  Her  does note that she is not very active due to left low back pain.  She sits on the couch a lot.  She does not like to take medicine for pain, but we discussed that her relative inactivity may have contributed to her blood clot risk.  I encouraged her to take Tylenol arthritis once or twice daily, as well as topical arthritis rubs to the painful area 4 times a day.  She agrees to try this, will let me know if it is not effective.    Denies any known family history of DVT    Past Medical History:   Diagnosis Date    Acquired hypothyroidism 5/7/2021    Asthma     seasonal    Cancer (HCC)     R breast cancer mestatized to liver 2000    Coronary artery disease involving native artery of transplanted heart without angina pectoris     History of TIAs     short term memory    Hypertension     TIA (transient ischemic attack) last one 2013    h/o TIA's    Type 2 diabetes mellitus treated without insulin (HCC) 5/7/2021        Past Surgical History:   Procedure Laterality Date    BREAST SURGERY      R breast mastectomy .  Dr. Murphy    CARDIAC CATH PROCEDURE  3/10/2016         CORONARY ARTERY ANGIOGRAM  3/10/2016         CORONARY ARTERY BYPASS GRAFT      ERCP  4/29/15    [facility]     LV ANGIOGRAPHY  3/10/2016         ORTHOPEDIC SURGERY      R hip & femur     OTHER SURGICAL HISTORY      endometrial biopsy-- normal     TONSILLECTOMY          Current Outpatient Medications   Medication Sig Dispense Refill    cephALEXin (KEFLEX) 500 MG capsule Take

## 2024-06-19 ENCOUNTER — TELEPHONE (OUTPATIENT)
Facility: CLINIC | Age: 84
End: 2024-06-19

## 2024-06-19 DIAGNOSIS — Z86.718 HISTORY OF DEEP VENOUS THROMBOSIS (DVT) OF DISTAL VEIN OF RIGHT LOWER EXTREMITY: Primary | ICD-10-CM

## 2024-06-19 NOTE — TELEPHONE ENCOUNTER
Refill req   rivaroxaban (XARELTO) 20 MG TABS tablet       Pharmacy   [id number] - [facility] [address] [place] [address] W - P [phone] - F [phone] [[id number]]

## 2024-07-12 NOTE — PROGRESS NOTES
Peggy Jensen presents today for [facility].              1. \"Have you been to the ER, urgent care clinic since your last visit?  Hospitalized since your last visit?\" Yes 07- [facility]    2. \"Have you seen or consulted any other health care providers outside of the [facility] since your last visit?\" no     3. For patients aged 45-75: Has the patient had a colonoscopy / FIT/ Cologuard? NA - based on age      If the patient is female:    4. For patients aged 40-74: Has the patient had a mammogram within the past 2 years? NA - based on age or sex      5. For patients aged 21-65: Has the patient had a pap smear? NA - based on age or sex

## 2024-07-16 ENCOUNTER — OFFICE VISIT (OUTPATIENT)
Facility: CLINIC | Age: 84
End: 2024-07-16
Payer: MEDICARE

## 2024-07-16 VITALS
OXYGEN SATURATION: 97 % | BODY MASS INDEX: 24.91 KG/M2 | SYSTOLIC BLOOD PRESSURE: 111 MMHG | WEIGHT: 155 LBS | DIASTOLIC BLOOD PRESSURE: 63 MMHG | TEMPERATURE: 97.5 F | HEART RATE: 66 BPM | RESPIRATION RATE: 18 BRPM | HEIGHT: 66 IN

## 2024-07-16 DIAGNOSIS — R60.0 BILATERAL LOWER EXTREMITY EDEMA: ICD-10-CM

## 2024-07-16 DIAGNOSIS — R93.1 DECREASED CARDIAC EJECTION FRACTION: ICD-10-CM

## 2024-07-16 DIAGNOSIS — I82.459 DEEP VENOUS THROMBOSIS OF PERONEAL VEIN, UNSPECIFIED LATERALITY (HCC): ICD-10-CM

## 2024-07-16 DIAGNOSIS — I10 ESSENTIAL (PRIMARY) HYPERTENSION: ICD-10-CM

## 2024-07-16 DIAGNOSIS — Z09 HOSPITAL DISCHARGE FOLLOW-UP: Primary | ICD-10-CM

## 2024-07-16 DIAGNOSIS — M54.50 CHRONIC BILATERAL LOW BACK PAIN WITHOUT SCIATICA: ICD-10-CM

## 2024-07-16 DIAGNOSIS — G89.29 CHRONIC BILATERAL LOW BACK PAIN WITHOUT SCIATICA: ICD-10-CM

## 2024-07-16 DIAGNOSIS — M54.6 ACUTE BILATERAL THORACIC BACK PAIN: ICD-10-CM

## 2024-07-16 PROCEDURE — 1123F ACP DISCUSS/DSCN MKR DOCD: CPT | Performed by: INTERNAL MEDICINE

## 2024-07-16 PROCEDURE — 3078F DIAST BP <80 MM HG: CPT | Performed by: INTERNAL MEDICINE

## 2024-07-16 PROCEDURE — 99215 OFFICE O/P EST HI 40 MIN: CPT | Performed by: INTERNAL MEDICINE

## 2024-07-16 PROCEDURE — 3074F SYST BP LT 130 MM HG: CPT | Performed by: INTERNAL MEDICINE

## 2024-07-16 RX ORDER — AMLODIPINE BESYLATE 10 MG/1
10 TABLET ORAL DAILY
COMMUNITY
Start: 2024-07-09

## 2024-07-16 RX ORDER — FLUTICASONE FUROATE 100 UG/1
POWDER RESPIRATORY (INHALATION)
COMMUNITY

## 2024-07-16 RX ORDER — ROSUVASTATIN CALCIUM 20 MG/1
20 TABLET, COATED ORAL NIGHTLY
COMMUNITY

## 2024-07-16 SDOH — ECONOMIC STABILITY: INCOME INSECURITY: HOW HARD IS IT FOR YOU TO PAY FOR THE VERY BASICS LIKE FOOD, HOUSING, MEDICAL CARE, AND HEATING?: NOT HARD AT ALL

## 2024-07-16 SDOH — ECONOMIC STABILITY: FOOD INSECURITY: WITHIN THE PAST 12 MONTHS, THE FOOD YOU BOUGHT JUST DIDN'T LAST AND YOU DIDN'T HAVE MONEY TO GET MORE.: NEVER TRUE

## 2024-07-16 SDOH — ECONOMIC STABILITY: FOOD INSECURITY: WITHIN THE PAST 12 MONTHS, YOU WORRIED THAT YOUR FOOD WOULD RUN OUT BEFORE YOU GOT MONEY TO BUY MORE.: NEVER TRUE

## 2024-07-16 ASSESSMENT — ENCOUNTER SYMPTOMS
BLOOD IN STOOL: 0
SHORTNESS OF BREATH: 0
BACK PAIN: 1

## 2024-07-16 ASSESSMENT — PATIENT HEALTH QUESTIONNAIRE - PHQ9
SUM OF ALL RESPONSES TO PHQ QUESTIONS 1-9: 0
SUM OF ALL RESPONSES TO PHQ9 QUESTIONS 1 & 2: 0
SUM OF ALL RESPONSES TO PHQ QUESTIONS 1-9: 0
1. LITTLE INTEREST OR PLEASURE IN DOING THINGS: NOT AT ALL
SUM OF ALL RESPONSES TO PHQ QUESTIONS 1-9: 0
2. FEELING DOWN, DEPRESSED OR HOPELESS: NOT AT ALL
SUM OF ALL RESPONSES TO PHQ QUESTIONS 1-9: 0

## 2024-07-17 NOTE — PROGRESS NOTES
HPI     Peggy Jensen is here with her  after being hospitalized 7/3/2024 to 7/8/2024 for acute upper back pain, found to lower extremity edema and uncontrolled high blood pressure.  A CT scan of the chest showed subacute small pulmonary embolus on the right.  She reports she was instructed to take her Xarelto with her largest meal to improve absorption.    She finds it difficult to walk due to hip and knee pain.  She points to her lower back when referring to her hips.  Will get updated x-rays of the areas, and consider having her go back to physical therapy if there are no fractures.    They notice new bilateral lower extremity edema since they have been home.  On chart review, she was started on 10 mg of amlodipine.  Denies PND or orthopnea, no change in sodium intake.  I recommended they cut the amlodipine to 5 mg daily, limit sodium intake, and keep her legs elevated when possible.  They are to let me know if they do not start to improve soon    Past Medical History:   Diagnosis Date    Acquired hypothyroidism 5/7/2021    Asthma     seasonal    Cancer (HCC)     R breast cancer mestatized to liver 2000    Coronary artery disease involving native artery of transplanted heart without angina pectoris     History of TIAs     short term memory    Hypertension     TIA (transient ischemic attack) last one 2013    h/o TIA's    Type 2 diabetes mellitus treated without insulin (HCC) 5/7/2021        Past Surgical History:   Procedure Laterality Date    BREAST SURGERY      R breast mastectomy .  Dr. Murphy    CARDIAC CATH PROCEDURE  3/10/2016         CORONARY ARTERY ANGIOGRAM  3/10/2016         CORONARY ARTERY BYPASS GRAFT      ERCP  4/29/15    [facility]     LV ANGIOGRAPHY  3/10/2016         ORTHOPEDIC SURGERY      R hip & femur     OTHER SURGICAL HISTORY      endometrial biopsy-- normal     TONSILLECTOMY          Current Outpatient Medications   Medication Sig Dispense Refill    amLODIPine (NORVASC) 10 MG tablet

## 2024-08-06 ENCOUNTER — OFFICE VISIT (OUTPATIENT)
Facility: CLINIC | Age: 84
End: 2024-08-06

## 2024-08-06 VITALS
DIASTOLIC BLOOD PRESSURE: 63 MMHG | HEIGHT: 66 IN | OXYGEN SATURATION: 97 % | WEIGHT: 158 LBS | HEART RATE: 66 BPM | RESPIRATION RATE: 18 BRPM | SYSTOLIC BLOOD PRESSURE: 106 MMHG | BODY MASS INDEX: 25.39 KG/M2 | TEMPERATURE: 98 F

## 2024-08-06 DIAGNOSIS — E11.65 TYPE 2 DIABETES MELLITUS WITH HYPERGLYCEMIA, WITHOUT LONG-TERM CURRENT USE OF INSULIN (HCC): ICD-10-CM

## 2024-08-06 DIAGNOSIS — Z86.718 HISTORY OF DEEP VENOUS THROMBOSIS (DVT) OF DISTAL VEIN OF RIGHT LOWER EXTREMITY: ICD-10-CM

## 2024-08-06 DIAGNOSIS — I10 ESSENTIAL (PRIMARY) HYPERTENSION: ICD-10-CM

## 2024-08-06 DIAGNOSIS — R60.0 BILATERAL LOWER EXTREMITY EDEMA: Primary | ICD-10-CM

## 2024-08-06 RX ORDER — AMLODIPINE BESYLATE 10 MG/1
10 TABLET ORAL DAILY
Qty: 30 TABLET | Status: CANCELLED | OUTPATIENT
Start: 2024-08-06

## 2024-08-06 RX ORDER — AMLODIPINE BESYLATE 2.5 MG/1
2.5 TABLET ORAL DAILY
Qty: 90 TABLET | Refills: 1 | Status: SHIPPED | OUTPATIENT
Start: 2024-08-06

## 2024-08-06 RX ORDER — METFORMIN HYDROCHLORIDE 500 MG/1
TABLET, EXTENDED RELEASE ORAL
Qty: 270 TABLET | Refills: 1 | Status: SHIPPED | OUTPATIENT
Start: 2024-08-06

## 2024-08-06 ASSESSMENT — PATIENT HEALTH QUESTIONNAIRE - PHQ9
1. LITTLE INTEREST OR PLEASURE IN DOING THINGS: NOT AT ALL
SUM OF ALL RESPONSES TO PHQ QUESTIONS 1-9: 0
SUM OF ALL RESPONSES TO PHQ QUESTIONS 1-9: 0
SUM OF ALL RESPONSES TO PHQ9 QUESTIONS 1 & 2: 0
SUM OF ALL RESPONSES TO PHQ QUESTIONS 1-9: 0
2. FEELING DOWN, DEPRESSED OR HOPELESS: NOT AT ALL
SUM OF ALL RESPONSES TO PHQ QUESTIONS 1-9: 0

## 2024-08-06 ASSESSMENT — ENCOUNTER SYMPTOMS
BACK PAIN: 1
BLOOD IN STOOL: 0
SHORTNESS OF BREATH: 0

## 2024-08-06 NOTE — PROGRESS NOTES
Peggy Jensen presents today for BP.              1. \"Have you been to the ER, urgent care clinic since your last visit?  Hospitalized since your last visit?\" no    2. \"Have you seen or consulted any other health care providers outside of the [facility] since your last visit?\" no     3. For patients aged 45-75: Has the patient had a colonoscopy / FIT/ Cologuard? NA - based on age      If the patient is female:    4. For patients aged 40-74: Has the patient had a mammogram within the past 2 years? NA - based on age or sex      5. For patients aged 21-65: Has the patient had a pap smear? NA - based on age or sex  
Violence: Not on file   Housing Stability: Unknown (7/16/2024)    Housing Stability Vital Sign     Unable to Pay for Housing in the Last Year: Not on file     Number of Places Lived in the Last Year: Not on file     Unstable Housing in the Last Year: No        Family History   Problem Relation Age of Onset    Stroke Mother     Heart Attack Father         at 56    Breast Cancer Sister     High Cholesterol Son     Thyroid Disease Son         hypothyroidism    Heart Attack Son            /63   Pulse 66   Temp 98 °F (36.7 °C) (Temporal)   Resp 18   Ht 1.676 m (5' 6\")   Wt 71.7 kg (158 lb)   SpO2 97%   BMI 25.50 kg/m²      Review of Systems   Respiratory:  Negative for shortness of breath.    Cardiovascular:  Negative for chest pain.        No PND, no orthopnea.   Gastrointestinal:  Negative for blood in stool.   Genitourinary:  Negative for hematuria.   Musculoskeletal:  Positive for back pain.   Psychiatric/Behavioral:  Negative for dysphoric mood. The patient is not nervous/anxious.         Physical Exam  Constitutional:       General: She is not in acute distress.     Appearance: She is not ill-appearing.   Eyes:      General: No scleral icterus.     Conjunctiva/sclera: Conjunctivae normal.   Neck:      Comments: Carotid upstrokes normal to palpation.  Cardiovascular:      Rate and Rhythm: Normal rate and regular rhythm.      Pulses: Normal pulses.      Heart sounds: Murmur heard.      No gallop.   Pulmonary:      Effort: Pulmonary effort is normal.      Breath sounds: Normal breath sounds.   Abdominal:      Palpations: Abdomen is soft. There is no mass.      Tenderness: There is no abdominal tenderness.   Musculoskeletal:      Comments: No clubbing, no cyanosis.  There is 1+ bilateral ankle edema, nontender, no cords.   Skin:     General: Skin is warm and dry.   Neurological:      Mental Status: She is alert and oriented to person, place, and time.      Comments: Very hard of hearing, not wearing her

## 2024-08-19 ENCOUNTER — TELEPHONE (OUTPATIENT)
Facility: CLINIC | Age: 84
End: 2024-08-19

## 2024-08-19 NOTE — TELEPHONE ENCOUNTER
Pt  states pt is currently in donut hole with her ins.    ENTRESTO  is to expensive   Pt ins., told him to have pcp call and have medication moved from tier 3 to tier 2 that way they will not  have a co pay   [phone]  [email or web address]         If  has any questions please call   Yefri mcpherson [phone]

## 2024-08-21 NOTE — TELEPHONE ENCOUNTER
Please see if there is a form I can fill out for this, I do not know that I can call for a tier change? Check w/ then, thanks

## 2024-09-11 DIAGNOSIS — Z86.718 HISTORY OF DEEP VENOUS THROMBOSIS (DVT) OF DISTAL VEIN OF RIGHT LOWER EXTREMITY: ICD-10-CM

## 2024-09-11 RX ORDER — METOPROLOL SUCCINATE 50 MG/1
50 TABLET, EXTENDED RELEASE ORAL DAILY
Qty: 90 TABLET | Refills: 3 | Status: SHIPPED | OUTPATIENT
Start: 2024-09-11

## 2024-09-11 RX ORDER — RIVAROXABAN 20 MG/1
20 TABLET, FILM COATED ORAL DAILY
Qty: 30 TABLET | Refills: 3 | Status: SHIPPED | OUTPATIENT
Start: 2024-09-11

## 2024-09-23 ENCOUNTER — TELEPHONE (OUTPATIENT)
Facility: CLINIC | Age: 84
End: 2024-09-23

## 2024-09-23 DIAGNOSIS — E11.69 HYPERLIPIDEMIA ASSOCIATED WITH TYPE 2 DIABETES MELLITUS (HCC): Primary | ICD-10-CM

## 2024-09-23 DIAGNOSIS — E78.5 HYPERLIPIDEMIA ASSOCIATED WITH TYPE 2 DIABETES MELLITUS (HCC): Primary | ICD-10-CM

## 2024-09-23 RX ORDER — ROSUVASTATIN CALCIUM 20 MG/1
20 TABLET, COATED ORAL NIGHTLY
Qty: 90 TABLET | Refills: 3 | Status: SHIPPED | OUTPATIENT
Start: 2024-09-23

## 2024-10-02 NOTE — PROGRESS NOTES
Peggy Jensen presents today for BP and DM.        \"Have you been to the ER, urgent care clinic since your last visit?  Hospitalized since your last visit?\"    NO    “Have you seen or consulted any other health care providers outside of [facility] since your last visit?”    NO

## 2024-10-08 ENCOUNTER — OFFICE VISIT (OUTPATIENT)
Facility: CLINIC | Age: 84
End: 2024-10-08
Payer: MEDICARE

## 2024-10-08 VITALS
DIASTOLIC BLOOD PRESSURE: 70 MMHG | HEART RATE: 81 BPM | HEIGHT: 66 IN | SYSTOLIC BLOOD PRESSURE: 113 MMHG | OXYGEN SATURATION: 93 % | BODY MASS INDEX: 25.39 KG/M2 | WEIGHT: 158 LBS | RESPIRATION RATE: 16 BRPM | TEMPERATURE: 98.2 F

## 2024-10-08 DIAGNOSIS — Z23 NEED FOR VACCINATION: ICD-10-CM

## 2024-10-08 DIAGNOSIS — I25.10 CORONARY ARTERY DISEASE INVOLVING NATIVE CORONARY ARTERY OF NATIVE HEART, UNSPECIFIED WHETHER ANGINA PRESENT: ICD-10-CM

## 2024-10-08 DIAGNOSIS — R60.0 EDEMA OF RIGHT LOWER EXTREMITY: ICD-10-CM

## 2024-10-08 DIAGNOSIS — R93.1 DECREASED CARDIAC EJECTION FRACTION: ICD-10-CM

## 2024-10-08 DIAGNOSIS — E11.65 TYPE 2 DIABETES MELLITUS WITH HYPERGLYCEMIA, WITHOUT LONG-TERM CURRENT USE OF INSULIN (HCC): Primary | ICD-10-CM

## 2024-10-08 DIAGNOSIS — I26.99 OTHER ACUTE PULMONARY EMBOLISM WITHOUT ACUTE COR PULMONALE (HCC): ICD-10-CM

## 2024-10-08 LAB — HBA1C MFR BLD: 8.5 %

## 2024-10-08 PROCEDURE — 3074F SYST BP LT 130 MM HG: CPT | Performed by: INTERNAL MEDICINE

## 2024-10-08 PROCEDURE — 99215 OFFICE O/P EST HI 40 MIN: CPT | Performed by: INTERNAL MEDICINE

## 2024-10-08 PROCEDURE — G0008 ADMIN INFLUENZA VIRUS VAC: HCPCS | Performed by: INTERNAL MEDICINE

## 2024-10-08 PROCEDURE — 90653 IIV ADJUVANT VACCINE IM: CPT | Performed by: INTERNAL MEDICINE

## 2024-10-08 PROCEDURE — 83036 HEMOGLOBIN GLYCOSYLATED A1C: CPT | Performed by: INTERNAL MEDICINE

## 2024-10-08 PROCEDURE — 1123F ACP DISCUSS/DSCN MKR DOCD: CPT | Performed by: INTERNAL MEDICINE

## 2024-10-08 PROCEDURE — 3078F DIAST BP <80 MM HG: CPT | Performed by: INTERNAL MEDICINE

## 2024-10-08 RX ORDER — METFORMIN HCL 500 MG
TABLET, EXTENDED RELEASE 24 HR ORAL
Qty: 360 TABLET | Refills: 1 | Status: SHIPPED | OUTPATIENT
Start: 2024-10-08

## 2024-10-08 ASSESSMENT — ENCOUNTER SYMPTOMS
SHORTNESS OF BREATH: 0
BLOOD IN STOOL: 0

## 2024-10-08 NOTE — PROGRESS NOTES
Peggy Jensen is a 84 y.o. female (: 1940) presenting to address:    Chief Complaint   Patient presents with    Follow-up     2 month follow up right foot swelling    Blood Pressure Check    Diabetes       Vitals:    10/08/24 1110   BP: 113/70   Pulse: 81   Resp: 16   Temp: 98.2 °F (36.8 °C)   SpO2: 93%       \"Have you been to the ER, urgent care clinic since your last visit?  Hospitalized since your last visit?\"    NO    “Have you seen or consulted any other health care providers outside of  since your last visit?”    NO

## 2024-10-09 NOTE — PROGRESS NOTES
Diabetes  Pertinent negatives for diabetes include no chest pain.      Peggy Jensen is here with her  for follow up of her DM.    R ankle edema, better on awakening, worse at the end of the day.  Multiple varicosities, (+) hx of recent DVT in RLE, advised to elevated legs when able, wear compression socks.  No PND, no orthopnea.    A1C 8.5%, better but still uncontrolled.  Discussed adding Jardiance for beneficial effect in chornic CHF, as well as improved glycemic control.  Discussing mechanism of action, need to monitor electrolytes to rule out volume depletion, and risk of vaginal yeast infections.    Has been on Xarelto about 3 months for VFE/PE.      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  Dr. Murphy    CARDIAC CATH PROCEDURE  3/10/2016         CORONARY ARTERY ANGIOGRAM  3/10/2016         CORONARY ARTERY BYPASS GRAFT      [facility]CP  4/29/15    [facility]     LV ANGIOGRAPHY  3/10/2016         ORTHOPEDIC SURGERY      R hip & femur     OTHER SURGICAL HISTORY      endometrial biopsy-- normal     TONSILLECTOMY          Current Outpatient Medications   Medication Sig Dispense Refill    metFORMIN (GLUCOPHAGE-XR) 500 MG extended release tablet 4 po daily 360 tablet 1    empagliflozin (JARDIANCE) 10 MG tablet 1 po daily 30 tablet 2    rosuvastatin (CRESTOR) 20 MG tablet Take 1 tablet by mouth nightly 90 tablet 3    sacubitril-valsartan (ENTRESTO) 24-26 MG per tablet Take 1 tablet by mouth 2 times daily 60 tablet 0

## 2024-10-14 DIAGNOSIS — R93.1 DECREASED CARDIAC EJECTION FRACTION: Primary | ICD-10-CM

## 2024-10-15 RX ORDER — SACUBITRIL AND VALSARTAN 24; 26 MG/1; MG/1
1 TABLET, FILM COATED ORAL 2 TIMES DAILY
Qty: 60 TABLET | Refills: 4 | Status: SHIPPED | OUTPATIENT
Start: 2024-10-15

## 2024-10-18 ENCOUNTER — HOSPITAL ENCOUNTER (OUTPATIENT)
Facility: HOSPITAL | Age: 84
Setting detail: SPECIMEN
Discharge: HOME | End: 2024-10-21

## 2024-10-18 ENCOUNTER — LAB (OUTPATIENT)
Facility: CLINIC | Age: 84
End: 2024-10-18

## 2024-10-18 DIAGNOSIS — E11.65 TYPE 2 DIABETES MELLITUS WITH HYPERGLYCEMIA, WITHOUT LONG-TERM CURRENT USE OF INSULIN (HCC): ICD-10-CM

## 2024-10-18 LAB — SENTARA SPECIMEN COLLECTION: NORMAL

## 2024-10-18 PROCEDURE — 99001 SPECIMEN HANDLING PT-LAB: CPT

## 2024-10-20 ENCOUNTER — TELEPHONE (OUTPATIENT)
Facility: CLINIC | Age: 84
End: 2024-10-20

## 2024-10-20 LAB
ANION GAP SERPL CALCULATED.3IONS-SCNC: 15 MMOL/L (ref 3–15)
BUN BLDV-MCNC: 16 MG/DL (ref 6–22)
CALCIUM SERPL-MCNC: 9.6 MG/DL (ref 8.4–10.5)
CHLORIDE BLD-SCNC: 100 MMOL/L (ref 98–110)
CO2: 25 MMOL/L (ref 20–32)
CREAT SERPL-MCNC: 0.8 MG/DL (ref 0.8–1.4)
GFR, ESTIMATED: >60 ML/MIN/1.73 SQ.M.
GLUCOSE: 175 MG/DL (ref 70–99)
POTASSIUM SERPL-SCNC: 3.5 MMOL/L (ref 3.5–5.5)
SODIUM BLD-SCNC: 140 MMOL/L (ref 133–145)

## 2024-12-16 ENCOUNTER — TELEPHONE (OUTPATIENT)
Facility: CLINIC | Age: 84
End: 2024-12-16

## 2024-12-16 DIAGNOSIS — E11.65 TYPE 2 DIABETES MELLITUS WITH HYPERGLYCEMIA, WITHOUT LONG-TERM CURRENT USE OF INSULIN (HCC): ICD-10-CM

## 2024-12-16 DIAGNOSIS — Z86.718 HISTORY OF DEEP VENOUS THROMBOSIS (DVT) OF DISTAL VEIN OF RIGHT LOWER EXTREMITY: ICD-10-CM

## 2024-12-16 RX ORDER — METFORMIN HYDROCHLORIDE 500 MG/1
TABLET, EXTENDED RELEASE ORAL
Qty: 360 TABLET | Refills: 0 | Status: SHIPPED | OUTPATIENT
Start: 2024-12-16

## 2024-12-16 NOTE — TELEPHONE ENCOUNTER
Patient requesting a referral on     metFORMIN     XARELTO     [id number] - [facility] [address] - P [phone] - F [phone] [[id number]]

## 2024-12-16 NOTE — TELEPHONE ENCOUNTER
10/8/24 note reviewed. PE 7/2024 - continue Xarelto for total of 6 months. Discuss stopping after Jan 2025 visit barring problems.     Further chart review shows patient was actually already on Xarelto from 4/24/2024 DVT.     Patient has upcoming 1/29/2025 appt with new PCP. Will approve Xarelto Rx through this appt. Metformin also approved.       Lab Results   Component Value Date/Time     10/18/2024 11:18 AM    K 3.5 10/18/2024 11:18 AM     10/18/2024 11:18 AM    CO2 25 10/18/2024 11:18 AM    BUN 16 10/18/2024 11:18 AM    CREATININE 0.8 10/18/2024 11:18 AM    GLUCOSE 175 10/18/2024 11:18 AM    CALCIUM 9.6 10/18/2024 11:18 AM    LABGLOM >60.0 10/18/2024 11:18 AM

## 2025-01-29 ENCOUNTER — TELEPHONE (OUTPATIENT)
Dept: PHARMACY | Facility: CLINIC | Age: 85
End: 2025-01-29

## 2025-01-29 ENCOUNTER — OFFICE VISIT (OUTPATIENT)
Facility: CLINIC | Age: 85
End: 2025-01-29

## 2025-01-29 VITALS
RESPIRATION RATE: 16 BRPM | OXYGEN SATURATION: 95 % | SYSTOLIC BLOOD PRESSURE: 120 MMHG | TEMPERATURE: 97.2 F | DIASTOLIC BLOOD PRESSURE: 66 MMHG | WEIGHT: 156 LBS | BODY MASS INDEX: 25.07 KG/M2 | HEIGHT: 66 IN | HEART RATE: 66 BPM

## 2025-01-29 DIAGNOSIS — M25.551 PAIN OF RIGHT HIP: ICD-10-CM

## 2025-01-29 DIAGNOSIS — M85.80 OSTEOPENIA, UNSPECIFIED LOCATION: ICD-10-CM

## 2025-01-29 DIAGNOSIS — I25.810 ATHEROSCLEROSIS OF CORONARY ARTERY BYPASS GRAFT OF NATIVE HEART WITHOUT ANGINA PECTORIS: ICD-10-CM

## 2025-01-29 DIAGNOSIS — E11.65 TYPE 2 DIABETES MELLITUS WITH HYPERGLYCEMIA, WITHOUT LONG-TERM CURRENT USE OF INSULIN (HCC): ICD-10-CM

## 2025-01-29 DIAGNOSIS — R26.2 TROUBLE WALKING: ICD-10-CM

## 2025-01-29 DIAGNOSIS — M51.34 DEGENERATIVE DISC DISEASE, THORACIC: ICD-10-CM

## 2025-01-29 DIAGNOSIS — I77.1 SUBCLAVIAN ARTERY STENOSIS, RIGHT (HCC): ICD-10-CM

## 2025-01-29 DIAGNOSIS — E03.9 ACQUIRED HYPOTHYROIDISM: ICD-10-CM

## 2025-01-29 DIAGNOSIS — Z85.3 PERSONAL HISTORY OF BREAST CANCER: ICD-10-CM

## 2025-01-29 DIAGNOSIS — M51.361 DEGENERATION OF INTERVERTEBRAL DISC OF LUMBAR REGION WITH LOWER EXTREMITY PAIN: ICD-10-CM

## 2025-01-29 DIAGNOSIS — R16.0 ENLARGED LIVER: ICD-10-CM

## 2025-01-29 DIAGNOSIS — I10 ESSENTIAL (PRIMARY) HYPERTENSION: ICD-10-CM

## 2025-01-29 DIAGNOSIS — Z91.81 AT HIGH RISK FOR FALLS: ICD-10-CM

## 2025-01-29 DIAGNOSIS — E55.9 VITAMIN D DEFICIENCY, UNSPECIFIED: Primary | ICD-10-CM

## 2025-01-29 DIAGNOSIS — I26.99 ACUTE PULMONARY EMBOLISM WITHOUT ACUTE COR PULMONALE, UNSPECIFIED PULMONARY EMBOLISM TYPE (HCC): ICD-10-CM

## 2025-01-29 DIAGNOSIS — R93.1 DECREASED CARDIAC EJECTION FRACTION: ICD-10-CM

## 2025-01-29 DIAGNOSIS — Z86.718 HISTORY OF DEEP VENOUS THROMBOSIS (DVT) OF DISTAL VEIN OF RIGHT LOWER EXTREMITY: ICD-10-CM

## 2025-01-29 DIAGNOSIS — Z98.890 HISTORY OF REPAIR OF HIP FRACTURE: ICD-10-CM

## 2025-01-29 DIAGNOSIS — R10.10 UPPER ABDOMINAL PAIN: ICD-10-CM

## 2025-01-29 RX ORDER — LANCETS 30 GAUGE
EACH MISCELLANEOUS
Qty: 100 EACH | Refills: 5 | Status: SHIPPED | OUTPATIENT
Start: 2025-01-29

## 2025-01-29 RX ORDER — SACUBITRIL AND VALSARTAN 24; 26 MG/1; MG/1
1 TABLET, FILM COATED ORAL 2 TIMES DAILY
Qty: 180 TABLET | Refills: 1 | Status: SHIPPED | OUTPATIENT
Start: 2025-01-29

## 2025-01-29 RX ORDER — GLUCOSAMINE HCL/CHONDROITIN SU 500-400 MG
CAPSULE ORAL
Qty: 100 STRIP | Refills: 5 | Status: SHIPPED | OUTPATIENT
Start: 2025-01-29

## 2025-01-29 RX ORDER — ERGOCALCIFEROL 1.25 MG/1
50000 CAPSULE ORAL
Qty: 12 CAPSULE | Refills: 3 | Status: SHIPPED | OUTPATIENT
Start: 2025-01-29

## 2025-01-29 RX ORDER — AMLODIPINE BESYLATE 2.5 MG/1
2.5 TABLET ORAL DAILY
Qty: 90 TABLET | Refills: 1 | Status: SHIPPED | OUTPATIENT
Start: 2025-01-29

## 2025-01-29 RX ORDER — METFORMIN HYDROCHLORIDE 500 MG/1
TABLET, EXTENDED RELEASE ORAL
Qty: 360 TABLET | Refills: 0 | Status: SHIPPED | OUTPATIENT
Start: 2025-01-29

## 2025-01-29 RX ORDER — BLOOD-GLUCOSE METER
1 KIT MISCELLANEOUS DAILY
Qty: 1 KIT | Refills: 0 | Status: SHIPPED | OUTPATIENT
Start: 2025-01-29

## 2025-01-29 RX ORDER — LEVOTHYROXINE SODIUM 125 UG/1
125 TABLET ORAL DAILY
Qty: 90 TABLET | Refills: 3 | Status: SHIPPED | OUTPATIENT
Start: 2025-01-29

## 2025-01-29 SDOH — ECONOMIC STABILITY: FOOD INSECURITY: WITHIN THE PAST 12 MONTHS, THE FOOD YOU BOUGHT JUST DIDN'T LAST AND YOU DIDN'T HAVE MONEY TO GET MORE.: NEVER TRUE

## 2025-01-29 SDOH — ECONOMIC STABILITY: FOOD INSECURITY: WITHIN THE PAST 12 MONTHS, YOU WORRIED THAT YOUR FOOD WOULD RUN OUT BEFORE YOU GOT MONEY TO BUY MORE.: NEVER TRUE

## 2025-01-29 ASSESSMENT — PATIENT HEALTH QUESTIONNAIRE - PHQ9
SUM OF ALL RESPONSES TO PHQ QUESTIONS 1-9: 0
SUM OF ALL RESPONSES TO PHQ QUESTIONS 1-9: 0
2. FEELING DOWN, DEPRESSED OR HOPELESS: NOT AT ALL
1. LITTLE INTEREST OR PLEASURE IN DOING THINGS: NOT AT ALL
SUM OF ALL RESPONSES TO PHQ9 QUESTIONS 1 & 2: 0
SUM OF ALL RESPONSES TO PHQ QUESTIONS 1-9: 0
SUM OF ALL RESPONSES TO PHQ QUESTIONS 1-9: 0

## 2025-01-29 NOTE — TELEPHONE ENCOUNTER
**[facility]**    Attempt made to contact patient at the home number.    Left a message requesting a call back at [phone] to schedule the appointment      [facility]   Ambulatory Pharmacy Clinical   [phone]  Department, toll free: [phone], option 2

## 2025-01-29 NOTE — PROGRESS NOTES
\"Have you been to the ER, urgent care clinic since your last visit?  Hospitalized since your last visit?\"    NO    “Have you seen or consulted any other health care providers outside our system since your last visit?”    NO    Chief Complaint   Patient presents with    New Patient    Medication Check     Wants to clarify how to take arnuity ellipta - takes only PRN at this time    Fall     Fell about 1.5 weeks ago. She wants to make sure the pin she got during surgery is okay. Patient felt something pop when she leaned over.    Orders     xray    Walking ability has declined - wants to discuss

## 2025-01-29 NOTE — PATIENT INSTRUCTIONS
Make an appt with Dr. Ba , Dr. Benjamin do   Please ask your cardiology that needed to continue aspirin while on xeralto and ask your oncology how long you need to be on  xeralto

## 2025-01-29 NOTE — PROGRESS NOTES
Peggy Jensen (:  1940) is a 85 y.o. female, New patient, here for evaluation of the following chief complaint(s):  New Patient, Medication Check (Wants to clarify how to take arnuity ellipta - takes only PRN at this time), Fall (Fell about 1.5 weeks ago. She wants to make sure the pin she got during surgery is okay. Patient felt something pop when she leaned over.), Orders (xray), and Walking ability has declined - wants to discuss         Assessment & Plan  1. Diabetes Mellitus.  Her A1c level was recorded at 8.5 in October, indicating poor glycemic control. The goal is to maintain fasting blood glucose levels below 120. She is advised to commence Jardiance therapy, initially at a dosage of one tablet daily. She is also encouraged to monitor her blood glucose levels at home biweekly. Dietary modifications, including a low-carbohydrate, high-protein diet, are recommended. A referral to Dr. Thopmson for medication management and diabetes education will be made. A glucometer and supplies will be ordered. Blood work will be conducted prior to the next visit.    2. Breast Cancer.  She is advised to schedule an appointment with Dr. Hopkins for continued monitoring of her breast cancer and to discuss the duration of Xarelto therapy for her pulmonary and leg clots.    3. Cardiomyopathy.  Her blood pressure is well-regulated, and her kidney function is within normal limits. She is advised to continue her current medication regimen, including her cholesterol medication at night. She is also advised to consult with her cardiologist regarding the continuation of aspirin while on Xarelto. A referral to a cardiologist will be made for reestablishment of care.    4. Osteopenia.  She has severe osteopenia, which increases her risk of fractures from falls. She is advised to discuss potential treatments for osteopenia with Dr. Hopkins. An x-ray of the right hip will be ordered. A referral to physical therapy will be

## 2025-01-29 NOTE — TELEPHONE ENCOUNTER
Reason for referral: DM  Referring provider: Dr Durant  Referring provider office: [facility]  Referred to: Glenn Aranda, PharmD, BCACP, BC-ADM  Status of Patient: New Patient  Length of Appt: 60 minutes  Type of Appt: In Person   Patient need address: [address] [id number]

## 2025-01-30 NOTE — TELEPHONE ENCOUNTER
**[facility] to [facility] with [facility]**    Attempt made to contact patient at the home number.    Spoke to patient at home number and advised them of the previous message.    Patient verified understanding and scheduled a in person appointment .  Appointment scheduled for 3/5/25 at 1:00 pm for [facility]   Ambulatory Pharmacy Clinical   [phone]  Department, toll free: [phone], option 2       For Pharmacy Admin Tracking Only    Program: Medical Group  Recommendation Provided To: Patient/Caregiver: 2 via Telephone  Intervention Detail: Scheduled Appointment  Intervention Accepted By: Patient/Caregiver: 2  Gap Closed?: Yes   Time Spent (min): 10

## 2025-02-04 ENCOUNTER — HOSPITAL ENCOUNTER (OUTPATIENT)
Facility: HOSPITAL | Age: 85
Discharge: HOME | End: 2025-02-07
Payer: MEDICARE

## 2025-02-04 ENCOUNTER — HOSPITAL ENCOUNTER (OUTPATIENT)
Facility: HOSPITAL | Age: 85
Setting detail: SPECIMEN
Discharge: HOME | End: 2025-02-07

## 2025-02-04 ENCOUNTER — HOSPITAL ENCOUNTER (OUTPATIENT)
Facility: HOSPITAL | Age: 85
Discharge: HOME | End: 2025-02-07
Attending: FAMILY MEDICINE
Payer: MEDICARE

## 2025-02-04 DIAGNOSIS — M25.551 PAIN OF RIGHT HIP: ICD-10-CM

## 2025-02-04 DIAGNOSIS — R16.0 ENLARGED LIVER: ICD-10-CM

## 2025-02-04 DIAGNOSIS — R10.10 UPPER ABDOMINAL PAIN: ICD-10-CM

## 2025-02-04 LAB — SENTARA SPECIMEN COLLECTION: NORMAL

## 2025-02-04 PROCEDURE — 99001 SPECIMEN HANDLING PT-LAB: CPT

## 2025-02-04 PROCEDURE — 76705 ECHO EXAM OF ABDOMEN: CPT

## 2025-02-04 PROCEDURE — 73502 X-RAY EXAM HIP UNI 2-3 VIEWS: CPT

## 2025-02-05 ENCOUNTER — TELEPHONE (OUTPATIENT)
Facility: CLINIC | Age: 85
End: 2025-02-05

## 2025-02-05 LAB
A/G RATIO: 2.1 RATIO (ref 1.1–2.6)
ALBUMIN: 4.1 G/DL (ref 3.5–5)
ALP BLD-CCNC: 118 U/L (ref 40–120)
ALT SERPL-CCNC: 9 U/L (ref 5–40)
ANION GAP SERPL CALCULATED.3IONS-SCNC: 10 MMOL/L (ref 3–15)
AST SERPL-CCNC: 10 U/L (ref 10–37)
BILIRUB SERPL-MCNC: 0.4 MG/DL (ref 0.2–1.2)
BUN BLDV-MCNC: 14 MG/DL (ref 6–22)
CALCIUM SERPL-MCNC: 9.2 MG/DL (ref 8.4–10.5)
CHLORIDE BLD-SCNC: 98 MMOL/L (ref 98–110)
CHOLESTEROL, TOTAL: 98 MG/DL (ref 110–200)
CHOLESTEROL/HDL RATIO: 2.6 (ref 0–5)
CO2: 29 MMOL/L (ref 20–32)
CREAT SERPL-MCNC: 0.9 MG/DL (ref 0.8–1.4)
CREATININE, URINE  MG/DL: 88 MG/DL
ESTIMATED AVERAGE GLUCOSE: 205 MG/DL (ref 91–123)
GFR, ESTIMATED: 59.3 ML/MIN/1.73 SQ.M.
GLOBULIN: 2 G/DL (ref 2–4)
GLUCOSE: 166 MG/DL (ref 70–99)
HBA1C MFR BLD: 8.8 % (ref 4.8–5.6)
HCT VFR BLD CALC: 45 % (ref 35.1–48.3)
HDLC SERPL-MCNC: 38 MG/DL
HEMOGLOBIN: 13.8 G/DL (ref 11.7–16.1)
LDL CHOLESTEROL: 39 MG/DL (ref 50–99)
LDL/HDL RATIO: 1
MCH RBC QN AUTO: 30 PG (ref 26–34)
MCHC RBC AUTO-ENTMCNC: 31 G/DL (ref 31–36)
MCV RBC AUTO: 99 FL (ref 80–99)
MICROALBUMIN/CREAT 24H UR: 32.2 MG/L (ref 0.1–17)
MICROALBUMIN/CREAT UR-RTO: 36.6 (ref 0–30)
NON-HDL CHOLESTEROL: 60 MG/DL
PDW BLD-RTO: 14.1 % (ref 10–15.5)
PLATELET # BLD: 209 K/UL (ref 140–440)
PMV BLD AUTO: 9.6 FL (ref 9–13)
POTASSIUM SERPL-SCNC: 4.2 MMOL/L (ref 3.5–5.5)
RBC # BLD: 4.55 M/UL (ref 3.8–5.2)
SODIUM BLD-SCNC: 137 MMOL/L (ref 133–145)
TOTAL PROTEIN: 6.1 G/DL (ref 6.2–8.1)
TRIGL SERPL-MCNC: 105 MG/DL (ref 40–149)
TSH SERPL DL<=0.05 MIU/L-ACNC: 0.91 MCU/ML (ref 0.27–4.2)
VLDLC SERPL CALC-MCNC: 21 MG/DL (ref 8–30)
WBC # BLD: 7.6 K/UL (ref 4–11)

## 2025-02-05 NOTE — TELEPHONE ENCOUNTER
Patient identified with 2 identifiers (name and ).  Patient aware of No acute findings on hip x-ray. Arthritic changes over back spine. For now continue symptomatic treatment and if needed can consider ortho referral.

## 2025-02-05 NOTE — TELEPHONE ENCOUNTER
Pt is requesting results of the labs and Xray she had done yesterday. The US is not back as of yet. Please advise.

## 2025-02-12 DIAGNOSIS — R93.89 ABNORMAL ULTRASOUND: ICD-10-CM

## 2025-02-12 DIAGNOSIS — R10.11 RIGHT UPPER QUADRANT ABDOMINAL PAIN: Primary | ICD-10-CM

## 2025-03-03 NOTE — PROGRESS NOTES
Pharmacy Progress Note - Diabetes Management       Assessment / Plan:   Diabetes Management:  Per ADA guidelines, Pt's A1c is not at goal of < 7%.  Unknown current glycemic control other than a FBG value today above goal.  She has not been taking Jardiance 10mg qam.  Advised to start today and to increase fluid intake.  Will reassess with staggered SMBG checks in 4 weeks.     Hyperlipidemia:  The ASCVD Risk score (Enio MUNOZ, et al., 2019) failed to calculate for the following reasons:    The 2019 ASCVD risk score is only valid for ages 40 to 79 based on parameters listed. Current lipid treatment guidelines recommend at least moderate-intensity statin doses for all patients with diabetes to decrease overall ASCVD risk. Patient currently qualifies for a moderate intensity statin therapy based on current recommendations and is currently taking a high intensity statin.      Nutrition/Lifestyle Modifications:  - Educated pt on the importance of moderating carbohydrate intake. Reviewed sources of carbohydrates and method to help determine appropriate portion sizes (e.g., Diabetes Plate Method).  - Advised patient to avoid sugar-sweetened beverages and replace with water or diet/zero sugar option.  - Recommend ~30 minutes consistent, moderately intensive, exercise/day or ~150 minutes/week. Start small, stay consistent, and increase length and types of exercise, as tolerated.       Patient will return to clinic in 4 week(s) for follow up.        S/O: Ms. Peggy Jensen, a 85 y.o. 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  Pt was seen today for diabetes management.  Patient's last A1c was:   Hemoglobin A1C   Date Value Ref Range

## 2025-03-05 ENCOUNTER — PHARMACY VISIT (OUTPATIENT)
Facility: CLINIC | Age: 85
End: 2025-03-05

## 2025-03-05 DIAGNOSIS — E11.65 TYPE 2 DIABETES MELLITUS WITH HYPERGLYCEMIA, WITHOUT LONG-TERM CURRENT USE OF INSULIN (HCC): Primary | ICD-10-CM

## 2025-03-05 NOTE — PATIENT INSTRUCTIONS
Your Visit Summary:     Plan:  - Start checking your blood glucose at least once daily first thing in the morning, before lunch, dinner, or bedtime and bring in the meter and logs to the next visit    - Start taking the Jardiance 10mg every morning   * Increase fluid intake *    For any questions, please call [phone] or your PCP office.    Check and document your blood sugar first thing in the morning (fasting at least 8 hours), 2 hours after a meal, and/or before bedtime.   Bring your meter/log to all future visits.     Your blood sugar goals:  - Fasting (first thing in the morning)  blood sugar: 80 - 130mg/dL  - 2 hours after a meal: 80 - 180mg/dL    When you experience symptoms of low blood sugar (example: less than 70):  - Confirm low reading by checking your blood sugar.   - Then treat with 15 grams of carbohydrates (one-half cup of juice or regular soda, or 4-5 glucose tablets).  - Wait 15 minutes to recheck blood sugar.   - Then eat a protein containing meal/snack to prevent another low blood sugar episode. (example: peanut butter + crackers)    Nutrition:  - When reviewing a nutrition label, focus on the serving size, total calories, fat (and type of fats), total carbohydrates, sugar (and amount of added sugar), amount of fiber (good for your digestive), and amount of protein.  Refer to your nutrition label guide for more information.  - For a meal : max 45 - 60 grams of carbohydrates  - For a snack: max 15 grams of carbohydrates  - Reduce amount of saturated and trans fat.  Consider more unsaturated fat options as they are better for your heart health.   - Have at least 1 serving of lean fat protein with each meal.    - Increase fiber intake slowly to prevent constipation.   - Substitute fruit juices for the whole fruit    Low carb snack ideas (15 grams total carb or less):    String cheese or babybel with 6 crackers  4 peanut butter crackers  3 cups of popcorn  1 cup raw vegetables with hummus or

## 2025-03-12 ENCOUNTER — OFFICE VISIT (OUTPATIENT)
Facility: CLINIC | Age: 85
End: 2025-03-12
Payer: MEDICARE

## 2025-03-12 VITALS
HEIGHT: 66 IN | HEART RATE: 73 BPM | SYSTOLIC BLOOD PRESSURE: 120 MMHG | BODY MASS INDEX: 25.65 KG/M2 | RESPIRATION RATE: 16 BRPM | WEIGHT: 159.6 LBS | DIASTOLIC BLOOD PRESSURE: 60 MMHG | TEMPERATURE: 98 F | OXYGEN SATURATION: 94 %

## 2025-03-12 DIAGNOSIS — R19.8: ICD-10-CM

## 2025-03-12 DIAGNOSIS — I26.99 OTHER PULMONARY EMBOLISM WITHOUT ACUTE COR PULMONALE, UNSPECIFIED CHRONICITY (HCC): ICD-10-CM

## 2025-03-12 DIAGNOSIS — E11.65 POORLY CONTROLLED DIABETES MELLITUS (HCC): ICD-10-CM

## 2025-03-12 DIAGNOSIS — I77.1 SUBCLAVIAN ARTERY STENOSIS, RIGHT: ICD-10-CM

## 2025-03-12 DIAGNOSIS — Z78.0 POST-MENOPAUSAL: ICD-10-CM

## 2025-03-12 DIAGNOSIS — R26.2 TROUBLE WALKING: ICD-10-CM

## 2025-03-12 DIAGNOSIS — E11.65 POORLY CONTROLLED DIABETES MELLITUS (HCC): Primary | ICD-10-CM

## 2025-03-12 DIAGNOSIS — Z91.81 AT STANDARD RISK FOR FALL: ICD-10-CM

## 2025-03-12 DIAGNOSIS — M85.80 OSTEOPENIA, UNSPECIFIED LOCATION: ICD-10-CM

## 2025-03-12 DIAGNOSIS — K76.0 FATTY LIVER: ICD-10-CM

## 2025-03-12 PROCEDURE — 99214 OFFICE O/P EST MOD 30 MIN: CPT | Performed by: FAMILY MEDICINE

## 2025-03-12 PROCEDURE — 1123F ACP DISCUSS/DSCN MKR DOCD: CPT | Performed by: FAMILY MEDICINE

## 2025-03-12 PROCEDURE — 3074F SYST BP LT 130 MM HG: CPT | Performed by: FAMILY MEDICINE

## 2025-03-12 PROCEDURE — 1126F AMNT PAIN NOTED NONE PRSNT: CPT | Performed by: FAMILY MEDICINE

## 2025-03-12 PROCEDURE — 3078F DIAST BP <80 MM HG: CPT | Performed by: FAMILY MEDICINE

## 2025-03-12 PROCEDURE — 3052F HG A1C>EQUAL 8.0%<EQUAL 9.0%: CPT | Performed by: FAMILY MEDICINE

## 2025-03-12 NOTE — PROGRESS NOTES
\"Have you been to the ER, urgent care clinic since your last visit?  Hospitalized since your last visit?\"    NO    “Have you seen or consulted any other health care providers outside our system since your last visit?”    Dr. Morris Nolen, Dentistry LOV: 2/22025 to replace cap on tooth.        Chief Complaint   Patient presents with    Results    Diabetes    Hypertension    Thyroid Problem    Medication Check     Wants to know if she still needs to continue Xarelto    Swelling     Right foot swollen - had right knee pain earlier. No pain at this time.    Orders     Wheel chair    Requests [employer]ic[employer] Placard

## 2025-03-12 NOTE — PROGRESS NOTES
Peggy Jensen (:  1940) is a 85 y.o. female, Established patient, here for evaluation of the following chief complaint(s):  Results, Diabetes, Hypertension, Thyroid Problem, Medication Check (Wants to know if she still needs to continue Xarelto), Swelling (Right foot swollen - had right knee pain earlier. No pain at this time.), Orders (Wheel chair), and Requests [facility]         Assessment & Plan  1. Diabetes Mellitus.  Her A1c level is elevated at 8.8, indicating suboptimal glycemic control. She is currently on metformin and Jardiance. Given her borderline low kidney function, metformin will be discontinued. Tradjenta will be initiated, and she will continue with Jardiance. She is advised to monitor her fasting blood glucose levels at home and maintain a log. A follow-up appointment with Dr. Thompson is scheduled for 2025. She is encouraged to adhere to a diabetic diet. A repeat blood test will be conducted prior to her next visit.    2. Osteopenia.  Her last bone density test in  indicated a moderate risk for fractures. A repeat bone density test will be ordered. She is advised to continue her daily intake of vitamin D supplements at 2000 units.    3. Hepatocellular Changes and Fatty Liver.  An abdominal ultrasound revealed hepatocellular changes and fatty liver. A referral to a gastroenterologist has been made. If she does not receive a response within 1 to 2 weeks, she should inform us.    4. Subclavian Artery Stenosis.  Previous studies have shown subclavian artery stenosis. A referral to a vascular specialist has been made.    5. Back Pain, Upper Thoracic Pain, and Leg Pain.  She has been experiencing back pain, upper thoracic pain, and leg pain, which have been affecting her mobility. A referral to physical therapy has been made.    6. Deep Vein Thrombosis.  She has a history of deep vein thrombosis and is currently on Xarelto. A referral to a hematologist has been made for

## 2025-03-12 NOTE — PATIENT INSTRUCTIONS
Please call the following offices to schedule an appointment:    [facility]: [phone]    [facility] - Dr. Ba: [phone]    Cardiology, Dr. Enoch Del Rio: [phone]    [facility]: [phone]    [facility]: [phone]

## 2025-03-19 ENCOUNTER — HOSPITAL ENCOUNTER (OUTPATIENT)
Facility: HOSPITAL | Age: 85
Setting detail: SPECIMEN
Discharge: HOME | End: 2025-03-22

## 2025-03-19 LAB — SENTARA SPECIMEN COLLECTION: NORMAL

## 2025-03-19 PROCEDURE — 99001 SPECIMEN HANDLING PT-LAB: CPT

## 2025-03-20 ENCOUNTER — HOSPITAL ENCOUNTER (OUTPATIENT)
Facility: HOSPITAL | Age: 85
Discharge: HOME | End: 2025-03-23
Attending: FAMILY MEDICINE
Payer: MEDICARE

## 2025-03-20 DIAGNOSIS — Z78.0 POST-MENOPAUSAL: ICD-10-CM

## 2025-03-20 DIAGNOSIS — M85.80 OSTEOPENIA, UNSPECIFIED LOCATION: ICD-10-CM

## 2025-03-20 LAB
A/G RATIO: 1.9 RATIO (ref 1.1–2.6)
ALBUMIN: 4.2 G/DL (ref 3.5–5)
ALP BLD-CCNC: 94 U/L (ref 40–120)
ALT SERPL-CCNC: 12 U/L (ref 5–40)
ANION GAP SERPL CALCULATED.3IONS-SCNC: 10 MMOL/L (ref 3–15)
AST SERPL-CCNC: 12 U/L (ref 10–37)
BILIRUB SERPL-MCNC: 0.3 MG/DL (ref 0.2–1.2)
BUN BLDV-MCNC: 24 MG/DL (ref 6–22)
CALCIUM SERPL-MCNC: 9.5 MG/DL (ref 8.4–10.5)
CHLORIDE BLD-SCNC: 100 MMOL/L (ref 98–110)
CO2: 29 MMOL/L (ref 20–32)
CREAT SERPL-MCNC: 1 MG/DL (ref 0.8–1.4)
ESTIMATED AVERAGE GLUCOSE: 203 MG/DL (ref 91–123)
GFR, ESTIMATED: 52.7 ML/MIN/1.73 SQ.M.
GLOBULIN: 2.2 G/DL (ref 2–4)
GLUCOSE: 237 MG/DL (ref 70–99)
HBA1C MFR BLD: 8.7 % (ref 4.8–5.6)
POTASSIUM SERPL-SCNC: 3.9 MMOL/L (ref 3.5–5.5)
SODIUM BLD-SCNC: 139 MMOL/L (ref 133–145)
TOTAL PROTEIN: 6.4 G/DL (ref 6.2–8.1)

## 2025-03-20 PROCEDURE — 77080 DXA BONE DENSITY AXIAL: CPT

## 2025-03-24 ENCOUNTER — TELEPHONE (OUTPATIENT)
Facility: CLINIC | Age: 85
End: 2025-03-24

## 2025-03-24 ENCOUNTER — HOSPITAL ENCOUNTER (OUTPATIENT)
Facility: HOSPITAL | Age: 85
Setting detail: RECURRING SERIES
Discharge: HOME | End: 2025-03-27
Attending: FAMILY MEDICINE
Payer: MEDICARE

## 2025-03-24 PROCEDURE — 97535 SELF CARE MNGMENT TRAINING: CPT

## 2025-03-24 PROCEDURE — 97110 THERAPEUTIC EXERCISES: CPT

## 2025-03-24 PROCEDURE — 97162 PT EVAL MOD COMPLEX 30 MIN: CPT

## 2025-03-24 NOTE — PROGRESS NOTES
PT DAILY TREATMENT NOTE/NEURO EVAL     Patient Name: Peggy Jensen    Date: 3/24/2025    : 1940  Insurance: Payor: [employer] / Plan: [employer] ENGAGE HMO CSNP / Product Type: *No Product type* /      Patient  verified yes     Visit #   Current / Total 1 24   Time   In / Out 10:26 11:06   Pain   In / Out 0 0   Subjective Functional Status/Changes: See Subjective Summary     Treatment Area: Trouble walking  At standard risk for fall    SUBJECTIVE    Subjective functional status/changes:     Chief Complaint: Unsteady gait   History/Mechanism of Injury: Fall two months ago - going up the steps - increased falls since  Current Symptoms/Deficits: crawls up the steps, shuffle gait pattern, uses UE support when walking   Pain-  Current: 0/10     Worst: 5/10   Best: 0/10  Previous Treatment/Compliance: previous episodes of PT  Mobility Devices: 2 wheel walker for community distances, holds onto furniture in the home  PMHx/Surgical Hx: right hip and femur fx, HTN, hx of cancer, Type 2 diabetes, hearing impaired, arthritis, asthma, heart disease, thyroid problems     Diagnostic Tests: [] Lab work [] X-rays    [] CT [] MRI     [] Other:  Results:    Work Hx: Retired  Living Situation: 3 story home - lives with spouse   Household Modifications: might sell home due to multiple levels  Hobbies: none reported  PLOF: Modified independent  Pt Goals: \"be able to walk without aide if possible\"    OBJECTIVE/EXAMINATION    22 min [x]Eval     - untimed        Therapeutic Procedures:  Tx Min Billable or 1:1 Min (if diff from Tx Min) Procedure, Rationale, Specifics   10 10 40799 Therapeutic Exercise (timed):  increase ROM, strength, coordination, balance, and proprioception to improve patient's ability to progress to PLOF and address remaining functional goals. (see flow sheet as applicable)     Details if applicable:      [address] Self Care/Home Management (timed):  improve patient knowledge and

## 2025-03-24 NOTE — PROGRESS NOTES
[facility]  [address], [place] [address] Phone: [phone] Fax [phone]  Plan of Care / Statement of Necessity for Physical Therapy Services     Patient Name: Peggy Jensen : 1940   Treatment   Diagnosis: R26.89  Abnormalities of gait and mobility, R26.81  Unsteadiness on feet, and M62.81  GENERAL MUSCLE WEAKNESS Medical Diagnosis: Trouble walking [R26.2]  At standard risk for fall [Z91.81]   Onset Date: 2025 - referral  Payor Source: Payor: [employer] / Plan: [employer] ENGAGE HMO CSNP / Product Type: *No Product type* /    Referral Source: Mari Durant MD Start of Care (SOC): 3/24/2025   Prior Hospitalization: See medical history Provider #: [id number]   Prior Level of Function: Modified independent with ADL - spouse provided aide; Lives in a 3 story home with spouse - spoke about selling home in the near future   Comorbidities: HTN, hx of cancer, Type 2 diabetes,  hearing impaired, arthritis, asthma, heart disease, thyroid problems      Assessment / key information:  Pt is a pleasant 85 y.o. female who presents to PT with unsteady gait with a history of recent falls that resulted in injury to right knee/hip; increased incidences of falling in the last two months - mentioned not using AD in the home, however uses UE support on various object around the house. Pt demonstrates decreased strength in all bilateral LE, performing in horizontal plane. Pt also demonstrates decreased static and dynamic balance abilities which were observed throughout evaluation and Functional gait assessment. Pt's impairments have limited their ability to perform steps, ambulate without fear of falling, decreased step length, perform ADL without assistance, and maintain independence due to significant instability. Pt would benefit from skilled PT to address above deficits in order to improve their ability to

## 2025-03-24 NOTE — TELEPHONE ENCOUNTER
Pt's daughter in law called and states that pt missed her first appt with referral Cardiology Enoch Del Rio. Daughter in law sttaes that she called about 3 wks ago to get another referral and has not heard anything. Please advise.    nontoxic appearing, no apparent respiratory or physical distress, age appropriate behavior. + frontal left scalp contusion with overlying superficial abrasion. no crepitus. EYES: ROSHAN tracking objects and faces making wet tears EARS: TM without erythema or bulging. no hemotympaneum. NOSE: patent no congestion or rhinorrhea no epistaxis MOUTH: oral mucosa moist tongue and uvula midline, oropharnyx unremarkable no exudates or lesion. HEART RRR. LUNGS CTA no signs of respiratory distress no nasal flaring retractions or belly breathing. no adventitious breath sounds. ABD soft nd/nttp, no rebound or guarding. MSK: from of all extremities no signs of trauma. no cervical midline tenderness no ecchymosis  SKIN: no signs of infection, no cyanosis, no rash. NEURO: age appropriate behavior

## 2025-03-25 NOTE — TELEPHONE ENCOUNTER
An appointment request has been faxed to Dr. Enoch Del Rio, as requested. A fax confirmation has been received. Specialist office will contact patient to schedule an appointment. Patient has been sent by Dr. Del Rio in the past.

## 2025-03-25 NOTE — TELEPHONE ENCOUNTER
Spoke with Rossana Gamble (HIPAA verified-two patient identifiers verified) to advise patient has been referred to Dr. Enoch Del Rio. His office will reach out to her to schedule an appointment. Rossana was advised, if she has not heard from Dr. Del Rio's office by tomorrow 1 pm for scheduling, to call his office directly to set appointment. She verbalized understanding.

## 2025-03-26 ENCOUNTER — HOSPITAL ENCOUNTER (OUTPATIENT)
Facility: HOSPITAL | Age: 85
Setting detail: RECURRING SERIES
Discharge: HOME | End: 2025-03-29
Attending: FAMILY MEDICINE
Payer: MEDICARE

## 2025-03-26 PROCEDURE — 97112 NEUROMUSCULAR REEDUCATION: CPT

## 2025-03-26 PROCEDURE — 97110 THERAPEUTIC EXERCISES: CPT

## 2025-03-26 PROCEDURE — 97116 GAIT TRAINING THERAPY: CPT

## 2025-03-26 NOTE — PROGRESS NOTES
PHYSICAL / OCCUPATIONAL THERAPY - DAILY TREATMENT NOTE    Patient Name: Peggy Jensen    Date: 3/26/2025    : 1940  Insurance: Payor: [employer] / Plan: [employer] ENGAGE HMO CSNP / Product Type: *No Product type* /      Patient  verified Yes     Visit #   Current / Total 2 24   Time   In / Out 9:40 10:25   Pain   In / Out 0 0   Subjective Functional Status/Changes: I really feel like my balance is worse since the last fall     TREATMENT AREA =  Trouble walking  At standard risk for fall    OBJECTIVE      Therapeutic Procedures:    Tx Min Billable or 1:1 Min (if diff from Tx Min) Procedure, Rationale, Specifics   10 10 67839 Therapeutic Exercise (timed):  increase ROM, strength, coordination, balance, and proprioception to improve patient's ability to progress to PLOF and address remaining functional goals. (see flow sheet as applicable)     Details if applicable:       15 15 59017 Neuromuscular Re-Education (timed):  improve balance, coordination, kinesthetic sense, posture, core stability and proprioception to improve patient's ability to develop conscious control of individual muscles and awareness of position of extremities in order to progress to PLOF and address remaining functional goals. (see flow sheet as applicable)     Details if applicable:     15 15 21556 Gait Training (timed):    80 feet with parallel bars (assistive device) over level and obstacles surfaces with SBA to CGA level of assist. Cuing for step height.  To improve safety and dynamic movement with household/community ambulation.  (see flow sheet as applicable)     Details if applicable:  increase step height, pace and stability for stepping          Details if applicable:            Details if applicable:     45 45 MC BC Totals Reminder: bill using total billable min of TIMED therapeutic procedures (example: do not include dry needle or estim unattended, both untimed codes, in totals to left)  8-22 min = 1 unit;

## 2025-03-27 ENCOUNTER — RESULTS FOLLOW-UP (OUTPATIENT)
Age: 85
End: 2025-03-27

## 2025-03-28 ENCOUNTER — TELEPHONE (OUTPATIENT)
Facility: CLINIC | Age: 85
End: 2025-03-28

## 2025-03-28 NOTE — TELEPHONE ENCOUNTER
This pharmacy faxed over request for the following prescriptions to be filled:    Medication requested : Entresto   PCP:  Dr. Durant   Pharmacy or Print:  [facility]   Mail order or Local pharmacy [facility]     Scheduled appointment if not seen by current providers in office:  Lov  03/12/2025, F/u 03/31/2025

## 2025-03-31 DIAGNOSIS — R93.1 DECREASED CARDIAC EJECTION FRACTION: ICD-10-CM

## 2025-04-01 ENCOUNTER — TELEPHONE (OUTPATIENT)
Facility: CLINIC | Age: 85
End: 2025-04-01

## 2025-04-01 ENCOUNTER — HOSPITAL ENCOUNTER (OUTPATIENT)
Facility: HOSPITAL | Age: 85
Setting detail: RECURRING SERIES
Discharge: HOME | End: 2025-04-04
Attending: FAMILY MEDICINE
Payer: MEDICARE

## 2025-04-01 DIAGNOSIS — E11.69 HYPERLIPIDEMIA ASSOCIATED WITH TYPE 2 DIABETES MELLITUS: ICD-10-CM

## 2025-04-01 DIAGNOSIS — E78.5 HYPERLIPIDEMIA ASSOCIATED WITH TYPE 2 DIABETES MELLITUS: ICD-10-CM

## 2025-04-01 DIAGNOSIS — R93.1 DECREASED CARDIAC EJECTION FRACTION: ICD-10-CM

## 2025-04-01 PROCEDURE — 97112 NEUROMUSCULAR REEDUCATION: CPT

## 2025-04-01 PROCEDURE — 97530 THERAPEUTIC ACTIVITIES: CPT

## 2025-04-01 PROCEDURE — 97110 THERAPEUTIC EXERCISES: CPT

## 2025-04-01 RX ORDER — METOPROLOL SUCCINATE 50 MG/1
50 TABLET, EXTENDED RELEASE ORAL DAILY
Qty: 90 TABLET | Refills: 1 | Status: SHIPPED | OUTPATIENT
Start: 2025-04-01

## 2025-04-01 RX ORDER — ROSUVASTATIN CALCIUM 20 MG/1
20 TABLET, COATED ORAL NIGHTLY
Qty: 90 TABLET | Refills: 1 | Status: SHIPPED | OUTPATIENT
Start: 2025-04-01

## 2025-04-01 RX ORDER — SACUBITRIL AND VALSARTAN 24; 26 MG/1; MG/1
1 TABLET, FILM COATED ORAL 2 TIMES DAILY
Qty: 180 TABLET | Refills: 1 | OUTPATIENT
Start: 2025-04-01

## 2025-04-01 RX ORDER — SACUBITRIL AND VALSARTAN 24; 26 MG/1; MG/1
1 TABLET, FILM COATED ORAL 2 TIMES DAILY
Qty: 180 TABLET | Refills: 1 | Status: SHIPPED | OUTPATIENT
Start: 2025-04-01

## 2025-04-01 NOTE — PROGRESS NOTES
units; 53-67 min = 4 units; 68-82 min = 5 units   Total Total     Charge Capture    [x]  Patient Education billed concurrently with other procedures   [x] Review HEP    [] Progressed/Changed HEP, detail:    [] Other detail:       Objective Information/Functional Measures/Assessment    Pt reported no increased instability since last session. Introduced sitting LE strengthening exercises with verbal cues from therapist for sequencing and proper muscle activation. Standing balance required CGA at times due to lateral sway when eyes were closed. Trialed narrow JERI gait training, pt required UE support and verbal cues to maintain forward gaze due to increased visual reliance. Pt tolerated session well can progress as tolerated.     Patient will continue to benefit from skilled PT / OT services to modify and progress therapeutic interventions, analyze and address functional mobility deficits, analyze and address ROM deficits, analyze and address strength deficits, analyze and address soft tissue restrictions, analyze and cue for proper movement patterns, analyze and modify for postural abnormalities, analyze and address imbalance/dizziness, and instruct in home and community integration to address functional deficits and attain remaining goals.     Progress toward goals / Updated goals:  []  See Progress Note/Recertification     1. Pt to report compliance to initial Hep to demonstrate improved strength required for ADL.   Status at last note/certification: established at evaluation     Long Term Goals: To be accomplished in 16 weeks  1. Pt to improve all bilateral LE MMT to 3+/5 or better in an against gravity plane to demonstrate improved strength required for ADL.   Status at last note/certification: LE Strength:    Right (/5) Left (/5)   Hip     Flexion 3- 3-             Abduction (standing)  2 2             Adduction  (Standing) 2 2             Extension  (Standing) 2 2   Current: progressing with LE strengthening

## 2025-04-01 NOTE — TELEPHONE ENCOUNTER
This patient contacted office for the following prescriptions to be filled:    Medication requested :   sacubitril-valsartan (ENTRESTO) 24-26 MG per tablet    metoprolol succinate (TOPROL XL) 50 MG extended release tablet  QTY 90   rosuvastatin (CRESTOR) 20 MG tablet QTY 90  PCP:  Bhargav  Pharmacy or Print: [facility]  Mail order or Local pharmacy [address]     Scheduled appointment if not seen by current providers in office: LOV 3/12/2025 FU 4/14/2025. Pt didn't receive RX from [facility] and would like to change it to [facility]. Pt is out of BP med

## 2025-04-02 NOTE — TELEPHONE ENCOUNTER
Patient identified with 2 identifiers (name and ).  Spoke with patients spouse and he is aware medications have been approved. Will need to request from cardiologist next time.   Patient verbalizes understanding

## 2025-04-03 ENCOUNTER — APPOINTMENT (OUTPATIENT)
Facility: HOSPITAL | Age: 85
End: 2025-04-03
Attending: FAMILY MEDICINE
Payer: MEDICARE

## 2025-04-08 ENCOUNTER — HOSPITAL ENCOUNTER (OUTPATIENT)
Facility: HOSPITAL | Age: 85
Setting detail: RECURRING SERIES
Discharge: HOME | End: 2025-04-11
Attending: FAMILY MEDICINE
Payer: MEDICARE

## 2025-04-08 PROCEDURE — 97530 THERAPEUTIC ACTIVITIES: CPT

## 2025-04-08 PROCEDURE — 97112 NEUROMUSCULAR REEDUCATION: CPT

## 2025-04-08 PROCEDURE — 97110 THERAPEUTIC EXERCISES: CPT

## 2025-04-08 NOTE — PROGRESS NOTES
required for ADL.   Status at last note/certification: LE Strength:    Right (/5) Left (/5)   Hip     Flexion 3- 3-             Abduction (standing)  2 2             Adduction  (Standing) 2 2             Extension  (Standing) 2 2   Current: progressing with LE strengthening exercises (4/1/2025)  2. Pt to improve TUG time to 16 seconds with LRAD to demonstrate improved ambulation abilities/safety in the home/community.   Status at last note/certification: Timed Up and Go test (< 20 seconds): trial 1 = 23.01 seconds, trail 2 = 21.31 seconds, Average = 22.16 seconds   3. Pt to perform 8x STS from low plinth in 30 seconds without UE support or other compensations to demonstrate improved strength/endurance required for functional mobility.   Current: goal in progress - performed from medium high plinth without UE support and improved controlled descent after verbal cueing (4/8/25)  Status at last note/certification: 30 sec Sit to Stand test (12-16): 5 reps with bilateral UE support from low plinth - right knee valgus  4. Pt to improve FGA score to 13/30 or better to demonstrate improved dynamic balance abilities required for safety in the home/community.   Status at last note/certification: FGA: 8/30 pts   Current: Progressing with addition of static and dynamic ex's  (3/26/2025)     Next PN/ RC due 4/23/2025  Auth due (visit number/ date) none    PLAN  - Continue Plan of Care  - Upgrade activities as tolerated    Cookie Machado, PT    4/8/2025    1:08 PM  If an interpreting service was utilized for treatment of this patient, the contents of this document represent the material reviewed with the patient via the .     Future Appointments   Date Time Provider Department Center   4/10/2025 11:00 AM Roxann Ortega PT [facility]   4/14/2025  1:00 PM Mari Durant MD [facility]   4/15/2025 11:00 AM Roxann Ortega PT [facility]   4/17/2025 11:00 AM Roxann Ortega PT [facility]

## 2025-04-09 ENCOUNTER — TELEPHONE (OUTPATIENT)
Facility: CLINIC | Age: 85
End: 2025-04-09

## 2025-04-09 NOTE — TELEPHONE ENCOUNTER
This pharmacy faxed over request for the following prescriptions to be filled:    Medication requested : Clopidogrel 75mg   PCP:  Dr. Durant   Pharmacy or Print: [facility]   Mail order or Local pharmacy [address]     Scheduled appointment if not seen by current providers in office: Lov  03/31/2025, F/u 04/14/2025

## 2025-04-10 ENCOUNTER — APPOINTMENT (OUTPATIENT)
Facility: HOSPITAL | Age: 85
End: 2025-04-10
Attending: FAMILY MEDICINE
Payer: MEDICARE

## 2025-04-11 NOTE — TELEPHONE ENCOUNTER
Medication is not on patients medication list.  Last prescribed 02/21/2024  Note alternative medication

## 2025-04-14 DIAGNOSIS — E11.65 TYPE 2 DIABETES MELLITUS WITH HYPERGLYCEMIA, WITHOUT LONG-TERM CURRENT USE OF INSULIN (HCC): ICD-10-CM

## 2025-04-15 ENCOUNTER — APPOINTMENT (OUTPATIENT)
Facility: HOSPITAL | Age: 85
End: 2025-04-15
Attending: FAMILY MEDICINE
Payer: MEDICARE

## 2025-04-15 RX ORDER — METFORMIN HYDROCHLORIDE 500 MG/1
TABLET, EXTENDED RELEASE ORAL
Qty: 360 TABLET | Refills: 3 | OUTPATIENT
Start: 2025-04-15

## 2025-04-17 ENCOUNTER — HOSPITAL ENCOUNTER (OUTPATIENT)
Facility: HOSPITAL | Age: 85
Setting detail: RECURRING SERIES
Discharge: HOME | End: 2025-04-20
Attending: FAMILY MEDICINE
Payer: MEDICARE

## 2025-04-17 PROCEDURE — 97530 THERAPEUTIC ACTIVITIES: CPT

## 2025-04-17 PROCEDURE — 97112 NEUROMUSCULAR REEDUCATION: CPT

## 2025-04-17 PROCEDURE — 97110 THERAPEUTIC EXERCISES: CPT

## 2025-04-17 NOTE — PROGRESS NOTES
PHYSICAL / OCCUPATIONAL THERAPY - DAILY TREATMENT NOTE    Patient Name: Peggy Jensen    Date: 2025    : 1940  Insurance: Payor: [employer] / Plan: [employer] CSNP / Product Type: *No Product type* /      Patient  verified Yes     Visit #   Current / Total 5 24   Time   In / Out 11:04 11:42   Pain   In / Out 7-8/10 8   Subjective Functional Status/Changes: Pt reports 7-8/10 B knee pain with walking.  Pt reports non-compliance with HEP, reports that she is in process of moving houses.     TREATMENT AREA =  Trouble walking  At standard risk for fall    OBJECTIVE    Therapeutic Procedures:    Tx Min Billable or 1:1 Min (if diff from Tx Min) Procedure, Rationale, Specifics   20  65275 Therapeutic Exercise (timed):  increase ROM, strength, coordination, balance, and proprioception to improve patient's ability to progress to PLOF and address remaining functional goals. (see flow sheet as applicable)     Details if applicable:  HR/TR, stand hip 3-way, LAQ, seated HS curl     8  69423 Neuromuscular Re-Education (timed):  improve balance, coordination, kinesthetic sense, posture, core stability and proprioception to improve patient's ability to develop conscious control of individual muscles and awareness of position of extremities in order to progress to PLOF and address remaining functional goals. (see flow sheet as applicable)     Details if applicable:  EO/EC balance on floor/foam   10  52329 Therapeutic Activity (timed):  use of dynamic activities replicating functional movements to increase ROM, strength, coordination, balance, and proprioception in order to improve patient's ability to progress to PLOF and address remaining functional goals.  (see flow sheet as applicable)     Details if applicable:  Sit to stand, gait with HT             38  MC BC Totals Reminder: bill using total billable min of TIMED therapeutic procedures (example: do not include dry needle or estim

## 2025-04-22 ENCOUNTER — APPOINTMENT (OUTPATIENT)
Facility: HOSPITAL | Age: 85
End: 2025-04-22
Attending: FAMILY MEDICINE
Payer: MEDICARE

## 2025-04-29 ENCOUNTER — APPOINTMENT (OUTPATIENT)
Facility: HOSPITAL | Age: 85
End: 2025-04-29
Attending: FAMILY MEDICINE
Payer: MEDICARE

## 2025-05-07 ENCOUNTER — OFFICE VISIT (OUTPATIENT)
Age: 85
End: 2025-05-07
Payer: MEDICARE

## 2025-05-07 VITALS
HEIGHT: 66 IN | OXYGEN SATURATION: 96 % | HEART RATE: 77 BPM | DIASTOLIC BLOOD PRESSURE: 60 MMHG | BODY MASS INDEX: 24.91 KG/M2 | WEIGHT: 155 LBS | SYSTOLIC BLOOD PRESSURE: 112 MMHG

## 2025-05-07 DIAGNOSIS — I77.1 SUBCLAVIAN ARTERY STENOSIS, RIGHT: Primary | ICD-10-CM

## 2025-05-07 DIAGNOSIS — I82.451 DEEP VENOUS THROMBOSIS (DVT) OF RIGHT PERONEAL VEIN, UNSPECIFIED CHRONICITY (HCC): ICD-10-CM

## 2025-05-07 PROCEDURE — 3078F DIAST BP <80 MM HG: CPT | Performed by: SURGERY

## 2025-05-07 PROCEDURE — 3074F SYST BP LT 130 MM HG: CPT | Performed by: SURGERY

## 2025-05-07 PROCEDURE — 1123F ACP DISCUSS/DSCN MKR DOCD: CPT | Performed by: SURGERY

## 2025-05-07 PROCEDURE — 99205 OFFICE O/P NEW HI 60 MIN: CPT | Performed by: SURGERY

## 2025-05-07 NOTE — PROGRESS NOTES
[facility]    Vascular Surgery Office Visit    Peggy Jensen  Chief Complaint   Patient presents with     Subclavian artery stenosis, right     Referred by PCP       History:  85 y.o. female with DM2 and hx of CABG, referred by Dr. Durant for evaluation of RUE subclavian artery stenosis which was identified on a CTA in July 2024 (approx 10 mos ago). She has never smoked. She denies hx of syncope or RUE cramping/weakness. She reports a hx of TIAs which occurred around the time of her CABG. Her CABG was performed in 2015, with the following grafts: LIMA-LAD, SVG-OM1-OM2, SVG right. She denies claudication or rest pain. No recent sx of CVA, TIA, or amaurosis fugax.     When the CTA chest was obtained in July 2024, she was also found to have a lobar PE. She remains on xarelto. She has been referred to a hematologist for further management. She reports a remote hx of a RLE DVT. No edema or pain of the RLE.       Physical Exam:    /60   Pulse 77   Ht 1.676 m (5' 6\")   Wt 70.3 kg (155 lb)   SpO2 96%   BMI 25.02 kg/m²      Constitutional:  Patient is well developed, well nourished, and not distressed.   HEENT: Atraumatic, normocephalic. No carotid bruits appreciated.  Cardiovascular:  Normal rate, regular rhythm, normal heart sounds.  Pulmonary/Chest: Effort normal and breath sounds normal.    Abdominal:   Soft, non-distended. No tenderness to palpation.   Extremities:   Palpable DP pulses bilaterally. Diminished R radial pulse. Normal L radial pulse. BLE warm. No edema or skin changes.   Neurological:  she  is alert and oriented x3. Motor & sensory grossly intact in all 4 limbs.       Imaging/Studies:   July 2024  CTA ch/a/p Tracey report reviewed: Acute/subacute RUL PE. Moderate-high grade R subclavian artery stenosis, distal to the R VA origin.     BLE venous duplex report: No DVT of either LE.     Nov 2013  Carotid duplex report: Mild bilateral ICA stenosis. Vertebral artery

## 2025-05-14 ENCOUNTER — OFFICE VISIT (OUTPATIENT)
Facility: CLINIC | Age: 85
End: 2025-05-14
Payer: MEDICARE

## 2025-05-14 VITALS
HEART RATE: 62 BPM | HEIGHT: 66 IN | RESPIRATION RATE: 16 BRPM | WEIGHT: 152.6 LBS | TEMPERATURE: 97.4 F | OXYGEN SATURATION: 95 % | BODY MASS INDEX: 24.53 KG/M2 | SYSTOLIC BLOOD PRESSURE: 128 MMHG | DIASTOLIC BLOOD PRESSURE: 60 MMHG

## 2025-05-14 DIAGNOSIS — Z95.1 H/O HEART BYPASS SURGERY: ICD-10-CM

## 2025-05-14 DIAGNOSIS — I25.810 ATHEROSCLEROSIS OF CORONARY ARTERY BYPASS GRAFT OF NATIVE HEART WITHOUT ANGINA PECTORIS: ICD-10-CM

## 2025-05-14 DIAGNOSIS — N18.31 STAGE 3A CHRONIC KIDNEY DISEASE (HCC): ICD-10-CM

## 2025-05-14 DIAGNOSIS — I26.99 OTHER PULMONARY EMBOLISM WITHOUT ACUTE COR PULMONALE, UNSPECIFIED CHRONICITY (HCC): ICD-10-CM

## 2025-05-14 DIAGNOSIS — Z00.00 MEDICARE ANNUAL WELLNESS VISIT, SUBSEQUENT: Primary | ICD-10-CM

## 2025-05-14 DIAGNOSIS — E11.65 POORLY CONTROLLED DIABETES MELLITUS (HCC): ICD-10-CM

## 2025-05-14 DIAGNOSIS — R80.9 MICROALBUMINURIA: ICD-10-CM

## 2025-05-14 DIAGNOSIS — I10 ESSENTIAL (PRIMARY) HYPERTENSION: ICD-10-CM

## 2025-05-14 DIAGNOSIS — M85.80 OSTEOPENIA, UNSPECIFIED LOCATION: ICD-10-CM

## 2025-05-14 DIAGNOSIS — K76.0 FATTY LIVER: ICD-10-CM

## 2025-05-14 DIAGNOSIS — I77.1 SUBCLAVIAN ARTERY STENOSIS, RIGHT: ICD-10-CM

## 2025-05-14 PROCEDURE — 99214 OFFICE O/P EST MOD 30 MIN: CPT | Performed by: FAMILY MEDICINE

## 2025-05-14 PROCEDURE — G0439 PPPS, SUBSEQ VISIT: HCPCS | Performed by: FAMILY MEDICINE

## 2025-05-14 PROCEDURE — 3052F HG A1C>EQUAL 8.0%<EQUAL 9.0%: CPT | Performed by: FAMILY MEDICINE

## 2025-05-14 PROCEDURE — 1123F ACP DISCUSS/DSCN MKR DOCD: CPT | Performed by: FAMILY MEDICINE

## 2025-05-14 PROCEDURE — 3074F SYST BP LT 130 MM HG: CPT | Performed by: FAMILY MEDICINE

## 2025-05-14 PROCEDURE — 1126F AMNT PAIN NOTED NONE PRSNT: CPT | Performed by: FAMILY MEDICINE

## 2025-05-14 PROCEDURE — 3078F DIAST BP <80 MM HG: CPT | Performed by: FAMILY MEDICINE

## 2025-05-14 ASSESSMENT — PATIENT HEALTH QUESTIONNAIRE - PHQ9
SUM OF ALL RESPONSES TO PHQ QUESTIONS 1-9: 0
2. FEELING DOWN, DEPRESSED OR HOPELESS: NOT AT ALL
1. LITTLE INTEREST OR PLEASURE IN DOING THINGS: NOT AT ALL
SUM OF ALL RESPONSES TO PHQ QUESTIONS 1-9: 0

## 2025-05-14 ASSESSMENT — LIFESTYLE VARIABLES
HOW MANY STANDARD DRINKS CONTAINING ALCOHOL DO YOU HAVE ON A TYPICAL DAY: PATIENT DOES NOT DRINK
HOW OFTEN DO YOU HAVE A DRINK CONTAINING ALCOHOL: NEVER

## 2025-05-14 NOTE — PROGRESS NOTES
Have you been to the ER, urgent care clinic since your last visit?  Hospitalized since your last visit?   NO    Have you seen or consulted any other health care providers outside our system since your last visit?   NO    Chief Complaint   Patient presents with    Medicare AWV    Diabetes    Fatty liver    At stand risk for fall    Referral - General     Pharmacist to discuss getting CGM

## 2025-05-14 NOTE — PATIENT INSTRUCTIONS
that look at your brain.  These questions and tests can make sure you don't have other conditions that can cause symptoms like MCI. These include depression, sleep problems, and side effects from medicines.  How is it treated?  There are no medicines to treat MCI or to keep it from progressing to dementia. But treating conditions like high blood pressure and diabetes may help. A person with MCI needs routine follow-up visits with their doctor to check on changes in the person's mental skills.  How can you care for yourself at home?  Keeping your body active can help slow MCI. Exercises like walking can help. Try to stay active mentally too. Read or do things like crossword puzzles if you enjoy doing them.  If you need help coping with MCI, you may want to get support from family, friends, a support group, or a counselor who works with people who have MCI.  Though the future isn't always clear, it can be good to plan ahead with instructions for your care. These are called advanced directives. Having a plan can help make sure that you get the care you want.  Current as of: December 3, 2024  Content Version: 14.4  © 0879-1569 [employer].   Care instructions adapted under license by [employer]. If you have questions about a medical condition or this instruction, always ask your healthcare professional. [employer] disclaims any warranty or liability for your use of this information.         Learning About Stress  What is stress?     Stress is your body's response to a hard situation. Your body can have a physical, emotional, or mental response. Stress is a fact of life for most people, and it affects everyone differently. What causes stress for you may not be stressful for someone else.  A lot of things can cause stress. You may feel stress when you go on a job interview, take a test, or run a race. This kind of short-term stress is normal and even useful. It can help you if you need to work

## 2025-05-14 NOTE — ACP (ADVANCE CARE PLANNING)
Advance Care Planning     General Advance Care Planning (ACP) Conversation    Date of Conversation: 5/14/2025  Conducted with: Patient with Decision Making Capacity  Other persons present: Yefri Jensen     Healthcare Decision Maker:   Primary Decision Maker: Yefri Jensen - Spouse - [phone]    Secondary Decision Maker: MikeyTrung - Child - [phone]    Supplemental (Other) Decision Maker: Mavis MikeyRossana - Daughter-in-Law - [phone]     Today we discussed advance directive. She had not made her decision or thought in detail about it. Given info about advance directive. Discussed as below . Given advance directive form to patient to review and complete.   Content/Action Overview:  See above   Reviewed DNR/DNI and patient elects Full Code (Attempt Resuscitation)  treatment goals, benefit/burden of treatment options, artificial nutrition, ventilation preferences, hospitalization preferences, resuscitation preferences, end of life care preferences (vegetative state/imminent death), and hospice care      Length of Voluntary ACP Conversation in minutes:  <16 minutes (Non-Billable)    Mari Durant MD

## 2025-05-14 NOTE — PROGRESS NOTES
Medicare Annual Wellness Visit    Peggy Jensen is here for Medicare AWV, Diabetes, Fatty liver, At stand risk for fall, and Referral - General (Pharmacist to discuss getting CGM)    Assessment & Plan  1. Medicare wellness visit.  - Cognitive function assessed using the clock test and recall test; did not pass the clock test but successfully recalled all three words in the recall test.  - Slight memory concerns reported; advised to undergo annual eye exams and biannual dental check-ups; should wear hearing aids consistently to improve hearing and potentially memory function.  - Advised to use support while walking to prevent falls; recently moved to a senior living community, which has improved mobility and reduced fall risk; reports anxiety and depression related to leaving long-term home but is adjusting well.  - Encouraged to stay active and create new memories in the new living environment; due for COVID booster, tetanus shot, shingles shot, and RSV shots; advised to go to the pharmacy for these vaccinations; bone density test shows osteopenia, indicating a stage before osteoporosis; should continue taking vitamin D supplements once a week; advised to follow up with cardiologist and vascular specialist as needed; blood glucose levels slightly elevated, last A1c was 8.7 indicating diabetes; should continue current diabetes medication regimen and follow up with endocrinologist if needed; advised to maintain a low-fat diet due to fatty liver changes noted in previous ultrasound.    2. Osteopenia.  - Recent bone density test shows osteopenia, indicating a stage before osteoporosis.  - Vitamin D supplements taken once a week to improve bone health.  - Advised to continue current vitamin D supplementation.  - Monitoring bone health through regular follow-ups.    3. Coronary artery disease.  - History of coronary artery disease and underwent quadruple bypass surgery.  - Currently taking Crestor, Xarelto, Toprol,

## 2025-06-26 DIAGNOSIS — Z86.718 HISTORY OF DEEP VENOUS THROMBOSIS (DVT) OF DISTAL VEIN OF RIGHT LOWER EXTREMITY: ICD-10-CM

## 2025-06-26 DIAGNOSIS — R93.1 DECREASED CARDIAC EJECTION FRACTION: ICD-10-CM

## 2025-06-26 DIAGNOSIS — E11.65 POORLY CONTROLLED DIABETES MELLITUS (HCC): ICD-10-CM

## 2025-06-26 DIAGNOSIS — E03.9 ACQUIRED HYPOTHYROIDISM: ICD-10-CM

## 2025-06-26 DIAGNOSIS — E11.69 HYPERLIPIDEMIA ASSOCIATED WITH TYPE 2 DIABETES MELLITUS (HCC): ICD-10-CM

## 2025-06-26 DIAGNOSIS — E78.5 HYPERLIPIDEMIA ASSOCIATED WITH TYPE 2 DIABETES MELLITUS (HCC): ICD-10-CM

## 2025-06-26 DIAGNOSIS — I10 ESSENTIAL (PRIMARY) HYPERTENSION: ICD-10-CM

## 2025-06-26 DIAGNOSIS — E55.9 VITAMIN D DEFICIENCY, UNSPECIFIED: ICD-10-CM

## 2025-06-26 NOTE — TELEPHONE ENCOUNTER
This patient contacted office for the following prescriptions to be filled:    Medication requested : linagliptin (TRADJENTA) 5 MG tablet QTY 90     metoprolol succinate (TOPROL XL) 50 MG extended release tablet QTY 90    rosuvastatin (CRESTOR) 20 MG tablet  QTY 90    sacubitril-valsartan (ENTRESTO) 24-26 MG per tablet   ergocalciferol (ERGOCALCIFEROL) 1.25 MG (31303 UT) capsule QTY 12    levothyroxine (SYNTHROID) 125 MCG tablet QTY 90   amLODIPine (NORVASC) 2.5 MG tablet QTY 90     rivaroxaban (XARELTO) 20 MG TABS tablet QTY 90   PCP: Bhargav  Pharmacy or Print:  [facility]   Mail order or Local pharmacy [place]   Last office visit 5/14/2025  Next office visit 8/27/2025  Changing pharmacy

## 2025-06-27 RX ORDER — ROSUVASTATIN CALCIUM 20 MG/1
20 TABLET, COATED ORAL NIGHTLY
Qty: 90 TABLET | Refills: 1 | Status: SHIPPED | OUTPATIENT
Start: 2025-06-27

## 2025-06-27 RX ORDER — SACUBITRIL AND VALSARTAN 24; 26 MG/1; MG/1
1 TABLET, FILM COATED ORAL 2 TIMES DAILY
Qty: 180 TABLET | Refills: 1 | Status: SHIPPED | OUTPATIENT
Start: 2025-06-27

## 2025-06-27 RX ORDER — ERGOCALCIFEROL 1.25 MG/1
50000 CAPSULE ORAL
Qty: 12 CAPSULE | Refills: 3 | Status: SHIPPED | OUTPATIENT
Start: 2025-06-27

## 2025-06-27 RX ORDER — LEVOTHYROXINE SODIUM 125 UG/1
125 TABLET ORAL DAILY
Qty: 90 TABLET | Refills: 3 | Status: SHIPPED | OUTPATIENT
Start: 2025-06-27

## 2025-06-27 RX ORDER — METOPROLOL SUCCINATE 50 MG/1
50 TABLET, EXTENDED RELEASE ORAL DAILY
Qty: 90 TABLET | Refills: 1 | Status: SHIPPED | OUTPATIENT
Start: 2025-06-27

## 2025-06-27 RX ORDER — AMLODIPINE BESYLATE 2.5 MG/1
2.5 TABLET ORAL DAILY
Qty: 90 TABLET | Refills: 1 | Status: SHIPPED | OUTPATIENT
Start: 2025-06-27

## 2025-07-14 DIAGNOSIS — Z86.718 HISTORY OF DEEP VENOUS THROMBOSIS (DVT) OF DISTAL VEIN OF RIGHT LOWER EXTREMITY: ICD-10-CM

## 2025-07-14 DIAGNOSIS — I10 ESSENTIAL (PRIMARY) HYPERTENSION: ICD-10-CM

## 2025-07-14 NOTE — TELEPHONE ENCOUNTER
This patient contacted the office for the following prescriptions to be refilled:    Medication requested :   Requested Prescriptions     Pending Prescriptions Disp Refills    amLODIPine (NORVASC) 2.5 MG tablet [Pharmacy Med Name: AMLODIPINE BESYLATE TABS 2.5MG] 90 tablet 3     Sig: TAKE 1 TABLET DAILY    XARELTO 20 MG TABS tablet [Pharmacy Med Name: XARELTO TABS 20MG] 90 tablet 3     Sig: TAKE 1 TABLET DAILY        Last Refilled: 6/27/2025 to local pharmacy, changed to mail order    Last Office Visit: 5/14/2025    Next Office Visit: 8/27/2025    Last Labs: 3/19/2025

## 2025-07-15 RX ORDER — AMLODIPINE BESYLATE 2.5 MG/1
2.5 TABLET ORAL DAILY
Qty: 90 TABLET | Refills: 1 | Status: SHIPPED | OUTPATIENT
Start: 2025-07-15

## 2025-07-15 RX ORDER — RIVAROXABAN 20 MG/1
20 TABLET, FILM COATED ORAL DAILY
Qty: 90 TABLET | Refills: 1 | Status: SHIPPED | OUTPATIENT
Start: 2025-07-15